# Patient Record
Sex: MALE | Race: BLACK OR AFRICAN AMERICAN | NOT HISPANIC OR LATINO | URBAN - METROPOLITAN AREA
[De-identification: names, ages, dates, MRNs, and addresses within clinical notes are randomized per-mention and may not be internally consistent; named-entity substitution may affect disease eponyms.]

---

## 2017-02-07 ENCOUNTER — EMERGENCY (EMERGENCY)
Facility: HOSPITAL | Age: 57
LOS: 0 days | Discharge: HOME | End: 2017-02-08
Attending: EMERGENCY MEDICINE
Payer: COMMERCIAL

## 2017-02-07 VITALS
DIASTOLIC BLOOD PRESSURE: 98 MMHG | WEIGHT: 205.03 LBS | RESPIRATION RATE: 17 BRPM | TEMPERATURE: 98 F | SYSTOLIC BLOOD PRESSURE: 140 MMHG | HEIGHT: 72 IN | OXYGEN SATURATION: 98 % | HEART RATE: 86 BPM

## 2017-02-07 LAB
ALBUMIN SERPL ELPH-MCNC: 4.3 G/DL — SIGNIFICANT CHANGE UP (ref 3.3–5)
ALP SERPL-CCNC: 148 U/L — HIGH (ref 40–120)
ALT FLD-CCNC: 54 U/L — SIGNIFICANT CHANGE UP (ref 12–78)
ANION GAP SERPL CALC-SCNC: 11 MMOL/L — SIGNIFICANT CHANGE UP (ref 5–17)
AST SERPL-CCNC: 24 U/L — SIGNIFICANT CHANGE UP (ref 15–37)
BASOPHILS # BLD AUTO: 0.1 K/UL — SIGNIFICANT CHANGE UP (ref 0–0.2)
BASOPHILS NFR BLD AUTO: 1.3 % — SIGNIFICANT CHANGE UP (ref 0–2)
BILIRUB SERPL-MCNC: 0.9 MG/DL — SIGNIFICANT CHANGE UP (ref 0.2–1.2)
BUN SERPL-MCNC: 21 MG/DL — SIGNIFICANT CHANGE UP (ref 7–23)
CALCIUM SERPL-MCNC: 9.3 MG/DL — SIGNIFICANT CHANGE UP (ref 8.5–10.1)
CHLORIDE SERPL-SCNC: 96 MMOL/L — SIGNIFICANT CHANGE UP (ref 96–108)
CO2 SERPL-SCNC: 25 MMOL/L — SIGNIFICANT CHANGE UP (ref 22–31)
CREAT SERPL-MCNC: 1.63 MG/DL — HIGH (ref 0.5–1.3)
EOSINOPHIL # BLD AUTO: 0.1 K/UL — SIGNIFICANT CHANGE UP (ref 0–0.5)
EOSINOPHIL NFR BLD AUTO: 1.3 % — SIGNIFICANT CHANGE UP (ref 0–6)
GLUCOSE SERPL-MCNC: 562 MG/DL — CRITICAL HIGH (ref 70–99)
HCT VFR BLD CALC: 42.9 % — SIGNIFICANT CHANGE UP (ref 39–50)
HGB BLD-MCNC: 15.6 G/DL — SIGNIFICANT CHANGE UP (ref 13–17)
LYMPHOCYTES # BLD AUTO: 2.3 K/UL — SIGNIFICANT CHANGE UP (ref 1–3.3)
LYMPHOCYTES # BLD AUTO: 21.4 % — SIGNIFICANT CHANGE UP (ref 13–44)
MCHC RBC-ENTMCNC: 29.7 PG — SIGNIFICANT CHANGE UP (ref 27–34)
MCHC RBC-ENTMCNC: 36.4 GM/DL — HIGH (ref 32–36)
MCV RBC AUTO: 81.7 FL — SIGNIFICANT CHANGE UP (ref 80–100)
MONOCYTES # BLD AUTO: 0.6 K/UL — SIGNIFICANT CHANGE UP (ref 0–0.9)
MONOCYTES NFR BLD AUTO: 5.6 % — SIGNIFICANT CHANGE UP (ref 2–14)
NEUTROPHILS # BLD AUTO: 7.5 K/UL — HIGH (ref 1.8–7.4)
NEUTROPHILS NFR BLD AUTO: 70.4 % — SIGNIFICANT CHANGE UP (ref 43–77)
PLATELET # BLD AUTO: 253 K/UL — SIGNIFICANT CHANGE UP (ref 150–400)
POTASSIUM SERPL-MCNC: 4.5 MMOL/L — SIGNIFICANT CHANGE UP (ref 3.5–5.3)
POTASSIUM SERPL-SCNC: 4.5 MMOL/L — SIGNIFICANT CHANGE UP (ref 3.5–5.3)
PROT SERPL-MCNC: 8.4 GM/DL — HIGH (ref 6–8.3)
RBC # BLD: 5.25 M/UL — SIGNIFICANT CHANGE UP (ref 4.2–5.8)
RBC # FLD: 12 % — SIGNIFICANT CHANGE UP (ref 11–15)
SODIUM SERPL-SCNC: 132 MMOL/L — LOW (ref 135–145)
WBC # BLD: 10.6 K/UL — HIGH (ref 3.8–10.5)
WBC # FLD AUTO: 10.6 K/UL — HIGH (ref 3.8–10.5)

## 2017-02-07 PROCEDURE — 99284 EMERGENCY DEPT VISIT MOD MDM: CPT

## 2017-02-07 RX ORDER — SODIUM CHLORIDE 9 MG/ML
2000 INJECTION INTRAMUSCULAR; INTRAVENOUS; SUBCUTANEOUS ONCE
Qty: 0 | Refills: 0 | Status: COMPLETED | OUTPATIENT
Start: 2017-02-07 | End: 2017-02-07

## 2017-02-07 RX ORDER — METFORMIN HYDROCHLORIDE 850 MG/1
500 TABLET ORAL ONCE
Qty: 0 | Refills: 0 | Status: COMPLETED | OUTPATIENT
Start: 2017-02-07 | End: 2017-02-07

## 2017-02-07 RX ADMIN — METFORMIN HYDROCHLORIDE 500 MILLIGRAM(S): 850 TABLET ORAL at 22:30

## 2017-02-07 RX ADMIN — SODIUM CHLORIDE 2000 MILLILITER(S): 9 INJECTION INTRAMUSCULAR; INTRAVENOUS; SUBCUTANEOUS at 22:31

## 2017-02-07 NOTE — ED PROVIDER NOTE - OBJECTIVE STATEMENT
Pertinent PMH/PSH/FHx/SHx and Review of Systems contained within:    57yo M w PMH of HTN, CAD s/p stent presents to ED for eval of hyperglycemia.  Pt states he went to see PMD bc he noted incr thirst, polyuria & intermittent blurry vision.  Pt had labs done, noted hyperglycemia & instructed to come to ED.  Denies CP, SOB, abd pain, N/V/D.  Pt was not given any meds for sx PTA. Pertinent PMH/PSH/FHx/SHx and Review of Systems contained within:    55yo M w PMH of HTN, CAD s/p stent presents to ED for eval of hyperglycemia.  Pt states he went to see PMD bc he noted incr thirst, polyuria & intermittent blurry vision.  Pt had labs done, noted hyperglycemia & instructed to come to ED.  Denies CP, SOB, abd pain, N/V/D.  Pt was not given any meds for sx PTA.    No fever/chills, No photophobia/eye pain, No ear pain/sore throat/dysphagia, No chest pain/palpitations, no SOB/cough/wheeze/stridor, No abdominal pain, No N/V/D, no dysuria/frequency/discharge, No neck/back pain, no rash, no changes in neurological status/function.

## 2017-02-07 NOTE — ED ADULT TRIAGE NOTE - CHIEF COMPLAINT QUOTE
I received a call from my doctor telling me to come to the er because my blood test said my sugar was 700.  he told me to come for an emergency shot of insulin.  I have never been a diabetic ever before.  I went to the doc because I have been feeling fatigued, increased urinating, and I am always thirsty.  I also lost 25 lbs since december.

## 2017-02-07 NOTE — ED PROVIDER NOTE - MEDICAL DECISION MAKING DETAILS
Pt w above dx, no anion gap VSS, in NAD.  Given first dose of metformin in ED.  Discussed results and outcome of testing with the patient, given copy as well.  Patient advised to please follow up with their primary care doctor within the next 24 hours and return to the Emergency Department for worsening symptoms or any other concerns.  Patient advised that their doctor may call  to follow up on the specific results of the tests performed today in the emergency department.  Given Rx and instructed/cautioned on use.

## 2017-02-07 NOTE — ED ADULT NURSE NOTE - OBJECTIVE STATEMENT
pt c/o of being fatigued lost about 25lbs since November and noticed his vision was blurrey on Friday. Went ot the MD office today did blood work and was told to go to the ER because his blood sugar was 700.

## 2017-02-07 NOTE — ED PROVIDER NOTE - PHYSICAL EXAMINATION
Gen: Alert, NAD, pleasant M, speaking in complete sentences;  Head: NC, AT, PERRL, EOMI, normal lids/conjunctiva;  ENT: normal hearing, patent oropharynx, MMM;  Neck: supple, no tenderness/meningismus/JVD, Trachea midline, FROM;  Pulm: Bilateral clear BS, normal resp effort, no wheeze/stridor/retractions;  CV: RRR, no M/R/G, +dist pulses;  Abd: soft, NT/ND, +BS, no guarding/rebound tenderness;  Mskel: no edema/erythema/cyanosis;  Skin: no rash;  Neuro: AAOx3, no sensory/motor deficits

## 2017-02-08 VITALS
SYSTOLIC BLOOD PRESSURE: 130 MMHG | OXYGEN SATURATION: 98 % | HEART RATE: 80 BPM | DIASTOLIC BLOOD PRESSURE: 80 MMHG | RESPIRATION RATE: 18 BRPM | TEMPERATURE: 98 F

## 2017-02-08 RX ORDER — METFORMIN HYDROCHLORIDE 850 MG/1
1 TABLET ORAL
Qty: 30 | Refills: 0
Start: 2017-02-08 | End: 2017-02-23

## 2017-02-09 DIAGNOSIS — Z95.5 PRESENCE OF CORONARY ANGIOPLASTY IMPLANT AND GRAFT: ICD-10-CM

## 2017-02-09 DIAGNOSIS — R73.9 HYPERGLYCEMIA, UNSPECIFIED: ICD-10-CM

## 2017-02-09 DIAGNOSIS — I10 ESSENTIAL (PRIMARY) HYPERTENSION: ICD-10-CM

## 2017-02-09 DIAGNOSIS — Z79.1 LONG TERM (CURRENT) USE OF NON-STEROIDAL ANTI-INFLAMMATORIES (NSAID): ICD-10-CM

## 2017-02-09 DIAGNOSIS — I25.10 ATHEROSCLEROTIC HEART DISEASE OF NATIVE CORONARY ARTERY WITHOUT ANGINA PECTORIS: ICD-10-CM

## 2017-02-13 ENCOUNTER — EMERGENCY (EMERGENCY)
Facility: HOSPITAL | Age: 57
LOS: 0 days | Discharge: HOME | End: 2017-02-13
Attending: EMERGENCY MEDICINE
Payer: COMMERCIAL

## 2017-02-13 VITALS
SYSTOLIC BLOOD PRESSURE: 131 MMHG | RESPIRATION RATE: 16 BRPM | DIASTOLIC BLOOD PRESSURE: 60 MMHG | TEMPERATURE: 98 F | OXYGEN SATURATION: 99 % | WEIGHT: 199.96 LBS | HEART RATE: 105 BPM | HEIGHT: 72 IN

## 2017-02-13 DIAGNOSIS — M54.9 DORSALGIA, UNSPECIFIED: ICD-10-CM

## 2017-02-13 DIAGNOSIS — I10 ESSENTIAL (PRIMARY) HYPERTENSION: ICD-10-CM

## 2017-02-13 DIAGNOSIS — E11.9 TYPE 2 DIABETES MELLITUS WITHOUT COMPLICATIONS: ICD-10-CM

## 2017-02-13 PROCEDURE — 99284 EMERGENCY DEPT VISIT MOD MDM: CPT

## 2017-02-13 RX ORDER — KETOROLAC TROMETHAMINE 30 MG/ML
30 SYRINGE (ML) INJECTION ONCE
Qty: 0 | Refills: 0 | Status: DISCONTINUED | OUTPATIENT
Start: 2017-02-13 | End: 2017-02-13

## 2017-02-13 RX ORDER — BACLOFEN 100 %
1 POWDER (GRAM) MISCELLANEOUS
Qty: 30 | Refills: 0
Start: 2017-02-13 | End: 2017-02-28

## 2017-02-13 RX ORDER — METHOCARBAMOL 500 MG/1
500 TABLET, FILM COATED ORAL ONCE
Qty: 0 | Refills: 0 | Status: COMPLETED | OUTPATIENT
Start: 2017-02-13 | End: 2017-02-13

## 2017-02-13 RX ORDER — TRAMADOL HYDROCHLORIDE 50 MG/1
1 TABLET ORAL
Qty: 20 | Refills: 0
Start: 2017-02-13 | End: 2017-02-18

## 2017-02-13 RX ADMIN — Medication 30 MILLIGRAM(S): at 16:22

## 2017-02-13 RX ADMIN — METHOCARBAMOL 500 MILLIGRAM(S): 500 TABLET, FILM COATED ORAL at 16:22

## 2017-02-23 ENCOUNTER — FORM ENCOUNTER (OUTPATIENT)
Age: 57
End: 2017-02-23

## 2017-02-24 ENCOUNTER — APPOINTMENT (OUTPATIENT)
Dept: CT IMAGING | Facility: IMAGING CENTER | Age: 57
End: 2017-02-24

## 2017-02-24 ENCOUNTER — OUTPATIENT (OUTPATIENT)
Dept: OUTPATIENT SERVICES | Facility: HOSPITAL | Age: 57
LOS: 1 days | End: 2017-02-24
Payer: COMMERCIAL

## 2017-02-24 DIAGNOSIS — N20.0 CALCULUS OF KIDNEY: ICD-10-CM

## 2017-02-24 PROCEDURE — 74176 CT ABD & PELVIS W/O CONTRAST: CPT

## 2019-03-27 ENCOUNTER — APPOINTMENT (OUTPATIENT)
Dept: UROLOGY | Facility: CLINIC | Age: 59
End: 2019-03-27

## 2019-04-03 ENCOUNTER — APPOINTMENT (OUTPATIENT)
Dept: UROLOGY | Facility: CLINIC | Age: 59
End: 2019-04-03

## 2019-05-20 ENCOUNTER — OTHER (OUTPATIENT)
Age: 59
End: 2019-05-20

## 2019-05-20 ENCOUNTER — APPOINTMENT (OUTPATIENT)
Dept: UROLOGY | Facility: CLINIC | Age: 59
End: 2019-05-20
Payer: COMMERCIAL

## 2019-05-20 VITALS
RESPIRATION RATE: 17 BRPM | TEMPERATURE: 98.3 F | HEART RATE: 81 BPM | BODY MASS INDEX: 29.42 KG/M2 | SYSTOLIC BLOOD PRESSURE: 141 MMHG | HEIGHT: 73 IN | DIASTOLIC BLOOD PRESSURE: 97 MMHG | WEIGHT: 222 LBS

## 2019-05-20 DIAGNOSIS — N20.0 CALCULUS OF KIDNEY: ICD-10-CM

## 2019-05-20 PROCEDURE — 99203 OFFICE O/P NEW LOW 30 MIN: CPT

## 2019-05-20 NOTE — HISTORY OF PRESENT ILLNESS
[FreeTextEntry1] : 59 year old male presents w/ bilateral staghorn stones. \par CT a/p Feb 2017 showed extensive b/l staghorn calculi.\par It seems like there is more parenchymal loss on the left kidney than on the right. \par He c/o muscle spasms in his back occasionally, but is asymptomatic \par Previously seen several urologists, but did not proceed with definitive treatment for unknown reasons.\par This pt will most likely require 2 sessions PCNL for each kidney.\par Explained the risks/benefits of PCNL.\par Potential complications of bleeding, transfusion, selective angioembolization if indicated, adjacent organ injury, fever, sepsis, infection, incomplete stone clearance, bowel or other unusual injury, chest complications, vascular injuries, procedures needed to address any complications, ureteral injury, anesthetic complications, all discussed.\par \par Blood work ordered. \par UA and urine culture ordered\par Renal scan ordered. \par Will schedule for PCNL.

## 2019-05-20 NOTE — ASSESSMENT
[FreeTextEntry1] : Blood work ordered. \par UA and urine culture ordered\par Renal scan ordered. \par Will schedule for PCNL.

## 2019-05-20 NOTE — ADDENDUM
[FreeTextEntry1] :  I, Megan Diego, acted solely as a scribe for Dr. Robert Padron. The documentation recorded by the scribe accurately reflects the service I personally performed and the decision by me.\par

## 2019-05-23 LAB
CALCIUM SERPL-MCNC: 9.5 MG/DL
CALCIUM SERPL-MCNC: 9.5 MG/DL
CREAT SERPL-MCNC: 1.63 MG/DL
PARATHYROID HORMONE INTACT: 27 PG/ML
PSA SERPL-MCNC: 4.19 NG/ML

## 2019-07-10 LAB
APPEARANCE: CLEAR
BACTERIA UR CULT: NORMAL
BACTERIA: NEGATIVE
BILIRUBIN URINE: NEGATIVE
BLOOD URINE: NEGATIVE
COLOR: NORMAL
GLUCOSE QUALITATIVE U: NEGATIVE
HYALINE CASTS: 0 /LPF
KETONES URINE: NEGATIVE
LEUKOCYTE ESTERASE URINE: ABNORMAL
MICROSCOPIC-UA: NORMAL
NITRITE URINE: NEGATIVE
PH URINE: 6
PROTEIN URINE: NEGATIVE
RED BLOOD CELLS URINE: 1 /HPF
SPECIFIC GRAVITY URINE: 1.02
SQUAMOUS EPITHELIAL CELLS: 4 /HPF
UROBILINOGEN URINE: NORMAL
WHITE BLOOD CELLS URINE: 70 /HPF

## 2019-07-15 ENCOUNTER — OUTPATIENT (OUTPATIENT)
Dept: OUTPATIENT SERVICES | Facility: HOSPITAL | Age: 59
LOS: 1 days | End: 2019-07-15
Payer: COMMERCIAL

## 2019-07-15 VITALS
HEART RATE: 76 BPM | DIASTOLIC BLOOD PRESSURE: 90 MMHG | WEIGHT: 216.93 LBS | TEMPERATURE: 97 F | RESPIRATION RATE: 16 BRPM | OXYGEN SATURATION: 98 % | HEIGHT: 73 IN | SYSTOLIC BLOOD PRESSURE: 146 MMHG

## 2019-07-15 DIAGNOSIS — Z01.818 ENCOUNTER FOR OTHER PREPROCEDURAL EXAMINATION: ICD-10-CM

## 2019-07-15 DIAGNOSIS — E11.9 TYPE 2 DIABETES MELLITUS WITHOUT COMPLICATIONS: ICD-10-CM

## 2019-07-15 DIAGNOSIS — I10 ESSENTIAL (PRIMARY) HYPERTENSION: ICD-10-CM

## 2019-07-15 DIAGNOSIS — N20.0 CALCULUS OF KIDNEY: ICD-10-CM

## 2019-07-15 DIAGNOSIS — Z98.890 OTHER SPECIFIED POSTPROCEDURAL STATES: Chronic | ICD-10-CM

## 2019-07-15 LAB
ANION GAP SERPL CALC-SCNC: 15 MMOL/L — SIGNIFICANT CHANGE UP (ref 5–17)
BLD GP AB SCN SERPL QL: NEGATIVE — SIGNIFICANT CHANGE UP
BUN SERPL-MCNC: 22 MG/DL — SIGNIFICANT CHANGE UP (ref 7–23)
CALCIUM SERPL-MCNC: 9.2 MG/DL — SIGNIFICANT CHANGE UP (ref 8.4–10.5)
CHLORIDE SERPL-SCNC: 105 MMOL/L — SIGNIFICANT CHANGE UP (ref 96–108)
CO2 SERPL-SCNC: 19 MMOL/L — LOW (ref 22–31)
CREAT SERPL-MCNC: 1.44 MG/DL — HIGH (ref 0.5–1.3)
GLUCOSE SERPL-MCNC: 156 MG/DL — HIGH (ref 70–99)
HBA1C BLD-MCNC: 7 % — HIGH (ref 4–5.6)
HCT VFR BLD CALC: 41.4 % — SIGNIFICANT CHANGE UP (ref 39–50)
HGB BLD-MCNC: 13.8 G/DL — SIGNIFICANT CHANGE UP (ref 13–17)
MCHC RBC-ENTMCNC: 29.1 PG — SIGNIFICANT CHANGE UP (ref 27–34)
MCHC RBC-ENTMCNC: 33.3 GM/DL — SIGNIFICANT CHANGE UP (ref 32–36)
MCV RBC AUTO: 87.3 FL — SIGNIFICANT CHANGE UP (ref 80–100)
PLATELET # BLD AUTO: 243 K/UL — SIGNIFICANT CHANGE UP (ref 150–400)
POTASSIUM SERPL-MCNC: 4.3 MMOL/L — SIGNIFICANT CHANGE UP (ref 3.5–5.3)
POTASSIUM SERPL-SCNC: 4.3 MMOL/L — SIGNIFICANT CHANGE UP (ref 3.5–5.3)
RBC # BLD: 4.74 M/UL — SIGNIFICANT CHANGE UP (ref 4.2–5.8)
RBC # FLD: 13.9 % — SIGNIFICANT CHANGE UP (ref 10.3–14.5)
RH IG SCN BLD-IMP: POSITIVE — SIGNIFICANT CHANGE UP
SODIUM SERPL-SCNC: 139 MMOL/L — SIGNIFICANT CHANGE UP (ref 135–145)
WBC # BLD: 9.2 K/UL — SIGNIFICANT CHANGE UP (ref 3.8–10.5)
WBC # FLD AUTO: 9.2 K/UL — SIGNIFICANT CHANGE UP (ref 3.8–10.5)

## 2019-07-15 RX ORDER — LIDOCAINE HCL 20 MG/ML
0.2 VIAL (ML) INJECTION ONCE
Refills: 0 | Status: DISCONTINUED | OUTPATIENT
Start: 2019-07-23 | End: 2019-07-25

## 2019-07-15 RX ORDER — SODIUM CHLORIDE 9 MG/ML
3 INJECTION INTRAMUSCULAR; INTRAVENOUS; SUBCUTANEOUS EVERY 8 HOURS
Refills: 0 | Status: DISCONTINUED | OUTPATIENT
Start: 2019-07-23 | End: 2019-07-25

## 2019-07-15 NOTE — H&P PST ADULT - NSICDXPASTMEDICALHX_GEN_ALL_CORE_FT
PAST MEDICAL HISTORY:  DM (diabetes mellitus)     High blood pressure PAST MEDICAL HISTORY:  Cerebrovascular accident (CVA) 2009 - no residuals    DM (diabetes mellitus) type 2    Eczema left arm    High blood pressure     Smoker for 45 years, down to 1/2 a pack daily

## 2019-07-15 NOTE — H&P PST ADULT - HISTORY OF PRESENT ILLNESS
60 yo male, PMH HTN, HLD, DM2, CVA ~ 10 years ago, known bilateral staghorn stones for over 10 years, PCP encouraged to see urologist since they are getting larger. Pt. presents to PST for scheduled Stage I, Left Percutaneous Nephrostolithotomy 7/23/19 and 2 days later will have Right PCNL. Pt. denies any back pain, flank pain, dysuria, fever, chills, chest pain, SOB, changes in bowel habits. 60 yo male, PMH HTN, HLD, DM2, CVA ~ 10 years ago (no residuals), known bilateral staghorn stones for over 10 years, PCP encouraged to see urologist since they are getting larger. Pt. presents to PST for scheduled Stage I, Left Percutaneous Nephrostolithotomy 7/23/19 and 2 days later will have Right PCNL. Pt. denies any back pain, flank pain, dysuria, fever, chills, chest pain, SOB, changes in bowel habits.

## 2019-07-15 NOTE — H&P PST ADULT - NEGATIVE GENERAL GENITOURINARY SYMPTOMS
no nocturia/no hematuria/normal urinary frequency/no bladder infections/no incontinence no nocturia/no hematuria/normal urinary frequency/no renal colic/no incontinence/no flank pain R/no bladder infections/no dysuria/no flank pain L

## 2019-07-15 NOTE — H&P PST ADULT - NEGATIVE ENMT SYMPTOMS
no sinus symptoms/no nasal congestion no vertigo/no sinus symptoms/no tinnitus/no hearing difficulty/no nasal congestion/no ear pain

## 2019-07-15 NOTE — H&P PST ADULT - NEGATIVE CARDIOVASCULAR SYMPTOMS
no palpitations/no chest pain/no dyspnea on exertion no chest pain/no peripheral edema/no palpitations/no dyspnea on exertion

## 2019-07-15 NOTE — H&P PST ADULT - NSICDXPROBLEM_GEN_ALL_CORE_FT
PROBLEM DIAGNOSES  Problem: Staghorn calculus  Assessment and Plan: Stage I, Left Percutaneous Nephrostolithotomy, followed by Stage II while in the hospital  Pre-op instructions, including Chlorhexidine soap, provided - al questions answered    Problem: Hypertension  Assessment and Plan: Continue BP meds as directed    Problem: DM2 (diabetes mellitus, type 2)  Assessment and Plan: FS on arrival to [facility]

## 2019-07-15 NOTE — H&P PST ADULT - NSICDXPASTSURGICALHX_GEN_ALL_CORE_FT
PAST SURGICAL HISTORY:  No significant past surgical history PAST SURGICAL HISTORY:  S/P repair of pectoralis muscle tear due to lift weighting 2012

## 2019-07-22 ENCOUNTER — TRANSCRIPTION ENCOUNTER (OUTPATIENT)
Age: 59
End: 2019-07-22

## 2019-07-23 ENCOUNTER — INPATIENT (INPATIENT)
Facility: HOSPITAL | Age: 59
LOS: 3 days | Discharge: HOME | DRG: 660 | End: 2019-07-27
Attending: UROLOGY | Admitting: UROLOGY
Payer: COMMERCIAL

## 2019-07-23 ENCOUNTER — RESULT REVIEW (OUTPATIENT)
Age: 59
End: 2019-07-23

## 2019-07-23 ENCOUNTER — APPOINTMENT (OUTPATIENT)
Dept: UROLOGY | Facility: HOSPITAL | Age: 59
End: 2019-07-23

## 2019-07-23 VITALS
OXYGEN SATURATION: 98 % | TEMPERATURE: 98 F | DIASTOLIC BLOOD PRESSURE: 94 MMHG | HEART RATE: 74 BPM | SYSTOLIC BLOOD PRESSURE: 138 MMHG | RESPIRATION RATE: 18 BRPM | WEIGHT: 216.93 LBS | HEIGHT: 73 IN

## 2019-07-23 DIAGNOSIS — N20.0 CALCULUS OF KIDNEY: ICD-10-CM

## 2019-07-23 DIAGNOSIS — Z01.818 ENCOUNTER FOR OTHER PREPROCEDURAL EXAMINATION: ICD-10-CM

## 2019-07-23 DIAGNOSIS — Z98.890 OTHER SPECIFIED POSTPROCEDURAL STATES: Chronic | ICD-10-CM

## 2019-07-23 LAB
ANION GAP SERPL CALC-SCNC: 13 MMOL/L — SIGNIFICANT CHANGE UP (ref 5–17)
BASOPHILS # BLD AUTO: 0 K/UL — SIGNIFICANT CHANGE UP (ref 0–0.2)
BUN SERPL-MCNC: 18 MG/DL — SIGNIFICANT CHANGE UP (ref 7–23)
CALCIUM SERPL-MCNC: 8.7 MG/DL — SIGNIFICANT CHANGE UP (ref 8.4–10.5)
CHLORIDE SERPL-SCNC: 104 MMOL/L — SIGNIFICANT CHANGE UP (ref 96–108)
CO2 SERPL-SCNC: 23 MMOL/L — SIGNIFICANT CHANGE UP (ref 22–31)
CREAT SERPL-MCNC: 1.54 MG/DL — HIGH (ref 0.5–1.3)
EOSINOPHIL # BLD AUTO: 0.2 K/UL — SIGNIFICANT CHANGE UP (ref 0–0.5)
EOSINOPHIL NFR BLD AUTO: 1 % — SIGNIFICANT CHANGE UP (ref 0–6)
GLUCOSE BLDC GLUCOMTR-MCNC: 140 MG/DL — HIGH (ref 70–99)
GLUCOSE BLDC GLUCOMTR-MCNC: 186 MG/DL — HIGH (ref 70–99)
GLUCOSE SERPL-MCNC: 183 MG/DL — HIGH (ref 70–99)
HCT VFR BLD CALC: 41.5 % — SIGNIFICANT CHANGE UP (ref 39–50)
HGB BLD-MCNC: 14.8 G/DL — SIGNIFICANT CHANGE UP (ref 13–17)
LYMPHOCYTES # BLD AUTO: 11 % — LOW (ref 13–44)
LYMPHOCYTES # BLD AUTO: 2.4 K/UL — SIGNIFICANT CHANGE UP (ref 1–3.3)
MCHC RBC-ENTMCNC: 31.1 PG — SIGNIFICANT CHANGE UP (ref 27–34)
MCHC RBC-ENTMCNC: 35.7 GM/DL — SIGNIFICANT CHANGE UP (ref 32–36)
MCV RBC AUTO: 87.1 FL — SIGNIFICANT CHANGE UP (ref 80–100)
MONOCYTES # BLD AUTO: 0.4 K/UL — SIGNIFICANT CHANGE UP (ref 0–0.9)
MONOCYTES NFR BLD AUTO: 1 % — LOW (ref 2–14)
NEUTROPHILS # BLD AUTO: 21.7 K/UL — HIGH (ref 1.8–7.4)
NEUTROPHILS NFR BLD AUTO: 84 % — HIGH (ref 43–77)
PLATELET # BLD AUTO: 134 K/UL — LOW (ref 150–400)
POTASSIUM SERPL-MCNC: 5.6 MMOL/L — HIGH (ref 3.5–5.3)
POTASSIUM SERPL-SCNC: 5.6 MMOL/L — HIGH (ref 3.5–5.3)
RBC # BLD: 4.76 M/UL — SIGNIFICANT CHANGE UP (ref 4.2–5.8)
RBC # FLD: 13.5 % — SIGNIFICANT CHANGE UP (ref 10.3–14.5)
RH IG SCN BLD-IMP: POSITIVE — SIGNIFICANT CHANGE UP
SODIUM SERPL-SCNC: 140 MMOL/L — SIGNIFICANT CHANGE UP (ref 135–145)
WBC # BLD: 24.7 K/UL — HIGH (ref 3.8–10.5)
WBC # FLD AUTO: 24.7 K/UL — HIGH (ref 3.8–10.5)

## 2019-07-23 PROCEDURE — 76000 FLUOROSCOPY <1 HR PHYS/QHP: CPT

## 2019-07-23 PROCEDURE — 80048 BASIC METABOLIC PNL TOTAL CA: CPT

## 2019-07-23 PROCEDURE — 88300 SURGICAL PATH GROSS: CPT | Mod: 26

## 2019-07-23 PROCEDURE — 83036 HEMOGLOBIN GLYCOSYLATED A1C: CPT

## 2019-07-23 PROCEDURE — 85027 COMPLETE CBC AUTOMATED: CPT

## 2019-07-23 PROCEDURE — 86900 BLOOD TYPING SEROLOGIC ABO: CPT

## 2019-07-23 PROCEDURE — G0463: CPT

## 2019-07-23 PROCEDURE — 86850 RBC ANTIBODY SCREEN: CPT

## 2019-07-23 PROCEDURE — 86901 BLOOD TYPING SEROLOGIC RH(D): CPT

## 2019-07-23 PROCEDURE — 71045 X-RAY EXAM CHEST 1 VIEW: CPT | Mod: 26

## 2019-07-23 RX ORDER — DEXTROSE 50 % IN WATER 50 %
25 SYRINGE (ML) INTRAVENOUS ONCE
Refills: 0 | Status: DISCONTINUED | OUTPATIENT
Start: 2019-07-23 | End: 2019-07-25

## 2019-07-23 RX ORDER — CEFAZOLIN SODIUM 1 G
VIAL (EA) INJECTION
Refills: 0 | Status: DISCONTINUED | OUTPATIENT
Start: 2019-07-23 | End: 2019-07-23

## 2019-07-23 RX ORDER — DEXTROSE 50 % IN WATER 50 %
15 SYRINGE (ML) INTRAVENOUS ONCE
Refills: 0 | Status: DISCONTINUED | OUTPATIENT
Start: 2019-07-23 | End: 2019-07-25

## 2019-07-23 RX ORDER — SENNA PLUS 8.6 MG/1
2 TABLET ORAL AT BEDTIME
Refills: 0 | Status: DISCONTINUED | OUTPATIENT
Start: 2019-07-23 | End: 2019-07-25

## 2019-07-23 RX ORDER — DEXTROSE 50 % IN WATER 50 %
12.5 SYRINGE (ML) INTRAVENOUS ONCE
Refills: 0 | Status: DISCONTINUED | OUTPATIENT
Start: 2019-07-23 | End: 2019-07-25

## 2019-07-23 RX ORDER — CEFAZOLIN SODIUM 1 G
1000 VIAL (EA) INJECTION ONCE
Refills: 0 | Status: DISCONTINUED | OUTPATIENT
Start: 2019-07-23 | End: 2019-07-24

## 2019-07-23 RX ORDER — HEPARIN SODIUM 5000 [USP'U]/ML
5000 INJECTION INTRAVENOUS; SUBCUTANEOUS EVERY 8 HOURS
Refills: 0 | Status: DISCONTINUED | OUTPATIENT
Start: 2019-07-23 | End: 2019-07-25

## 2019-07-23 RX ORDER — INSULIN LISPRO 100/ML
VIAL (ML) SUBCUTANEOUS
Refills: 0 | Status: DISCONTINUED | OUTPATIENT
Start: 2019-07-23 | End: 2019-07-25

## 2019-07-23 RX ORDER — CEFAZOLIN SODIUM 1 G
2000 VIAL (EA) INJECTION ONCE
Refills: 0 | Status: DISCONTINUED | OUTPATIENT
Start: 2019-07-23 | End: 2019-07-25

## 2019-07-23 RX ORDER — ONDANSETRON 8 MG/1
4 TABLET, FILM COATED ORAL ONCE
Refills: 0 | Status: DISCONTINUED | OUTPATIENT
Start: 2019-07-23 | End: 2019-07-24

## 2019-07-23 RX ORDER — ATORVASTATIN CALCIUM 80 MG/1
10 TABLET, FILM COATED ORAL AT BEDTIME
Refills: 0 | Status: DISCONTINUED | OUTPATIENT
Start: 2019-07-23 | End: 2019-07-25

## 2019-07-23 RX ORDER — SODIUM CHLORIDE 9 MG/ML
1000 INJECTION, SOLUTION INTRAVENOUS
Refills: 0 | Status: DISCONTINUED | OUTPATIENT
Start: 2019-07-23 | End: 2019-07-25

## 2019-07-23 RX ORDER — METOPROLOL TARTRATE 50 MG
25 TABLET ORAL DAILY
Refills: 0 | Status: DISCONTINUED | OUTPATIENT
Start: 2019-07-23 | End: 2019-07-25

## 2019-07-23 RX ORDER — SODIUM CHLORIDE 9 MG/ML
1000 INJECTION, SOLUTION INTRAVENOUS
Refills: 0 | Status: DISCONTINUED | OUTPATIENT
Start: 2019-07-23 | End: 2019-07-23

## 2019-07-23 RX ORDER — HYDROMORPHONE HYDROCHLORIDE 2 MG/ML
0.5 INJECTION INTRAMUSCULAR; INTRAVENOUS; SUBCUTANEOUS
Refills: 0 | Status: DISCONTINUED | OUTPATIENT
Start: 2019-07-23 | End: 2019-07-24

## 2019-07-23 RX ORDER — GENTAMICIN SULFATE 40 MG/ML
100 VIAL (ML) INJECTION EVERY 8 HOURS
Refills: 0 | Status: DISCONTINUED | OUTPATIENT
Start: 2019-07-23 | End: 2019-07-24

## 2019-07-23 RX ORDER — GLUCAGON INJECTION, SOLUTION 0.5 MG/.1ML
1 INJECTION, SOLUTION SUBCUTANEOUS ONCE
Refills: 0 | Status: DISCONTINUED | OUTPATIENT
Start: 2019-07-23 | End: 2019-07-25

## 2019-07-23 RX ORDER — CEFAZOLIN SODIUM 1 G
1000 VIAL (EA) INJECTION EVERY 8 HOURS
Refills: 0 | Status: DISCONTINUED | OUTPATIENT
Start: 2019-07-24 | End: 2019-07-24

## 2019-07-23 RX ORDER — OXYCODONE HYDROCHLORIDE 5 MG/1
5 TABLET ORAL EVERY 4 HOURS
Refills: 0 | Status: DISCONTINUED | OUTPATIENT
Start: 2019-07-23 | End: 2019-07-25

## 2019-07-23 RX ORDER — ACETAMINOPHEN 500 MG
650 TABLET ORAL EVERY 6 HOURS
Refills: 0 | Status: DISCONTINUED | OUTPATIENT
Start: 2019-07-23 | End: 2019-07-25

## 2019-07-23 RX ORDER — MORPHINE SULFATE 50 MG/1
4 CAPSULE, EXTENDED RELEASE ORAL
Refills: 0 | Status: DISCONTINUED | OUTPATIENT
Start: 2019-07-23 | End: 2019-07-25

## 2019-07-23 RX ORDER — CEFAZOLIN SODIUM 1 G
VIAL (EA) INJECTION
Refills: 0 | Status: DISCONTINUED | OUTPATIENT
Start: 2019-07-23 | End: 2019-07-24

## 2019-07-23 RX ORDER — INSULIN LISPRO 100/ML
VIAL (ML) SUBCUTANEOUS AT BEDTIME
Refills: 0 | Status: DISCONTINUED | OUTPATIENT
Start: 2019-07-23 | End: 2019-07-25

## 2019-07-23 RX ORDER — SODIUM CHLORIDE 9 MG/ML
1000 INJECTION INTRAMUSCULAR; INTRAVENOUS; SUBCUTANEOUS
Refills: 0 | Status: DISCONTINUED | OUTPATIENT
Start: 2019-07-23 | End: 2019-07-24

## 2019-07-23 RX ORDER — LOSARTAN POTASSIUM 100 MG/1
50 TABLET, FILM COATED ORAL DAILY
Refills: 0 | Status: DISCONTINUED | OUTPATIENT
Start: 2019-07-23 | End: 2019-07-25

## 2019-07-23 RX ADMIN — HYDROMORPHONE HYDROCHLORIDE 0.5 MILLIGRAM(S): 2 INJECTION INTRAMUSCULAR; INTRAVENOUS; SUBCUTANEOUS at 22:45

## 2019-07-23 RX ADMIN — HYDROMORPHONE HYDROCHLORIDE 0.5 MILLIGRAM(S): 2 INJECTION INTRAMUSCULAR; INTRAVENOUS; SUBCUTANEOUS at 23:00

## 2019-07-23 RX ADMIN — SODIUM CHLORIDE 125 MILLILITER(S): 9 INJECTION INTRAMUSCULAR; INTRAVENOUS; SUBCUTANEOUS at 23:41

## 2019-07-23 RX ADMIN — HYDROMORPHONE HYDROCHLORIDE 0.5 MILLIGRAM(S): 2 INJECTION INTRAMUSCULAR; INTRAVENOUS; SUBCUTANEOUS at 23:15

## 2019-07-23 RX ADMIN — HYDROMORPHONE HYDROCHLORIDE 0.5 MILLIGRAM(S): 2 INJECTION INTRAMUSCULAR; INTRAVENOUS; SUBCUTANEOUS at 22:34

## 2019-07-23 RX ADMIN — HYDROMORPHONE HYDROCHLORIDE 0.5 MILLIGRAM(S): 2 INJECTION INTRAMUSCULAR; INTRAVENOUS; SUBCUTANEOUS at 23:53

## 2019-07-23 RX ADMIN — HEPARIN SODIUM 5000 UNIT(S): 5000 INJECTION INTRAVENOUS; SUBCUTANEOUS at 22:26

## 2019-07-23 RX ADMIN — HYDROMORPHONE HYDROCHLORIDE 0.5 MILLIGRAM(S): 2 INJECTION INTRAMUSCULAR; INTRAVENOUS; SUBCUTANEOUS at 23:40

## 2019-07-23 RX ADMIN — ATORVASTATIN CALCIUM 10 MILLIGRAM(S): 80 TABLET, FILM COATED ORAL at 22:26

## 2019-07-23 RX ADMIN — SODIUM CHLORIDE 125 MILLILITER(S): 9 INJECTION, SOLUTION INTRAVENOUS at 22:25

## 2019-07-23 RX ADMIN — SODIUM CHLORIDE 3 MILLILITER(S): 9 INJECTION INTRAMUSCULAR; INTRAVENOUS; SUBCUTANEOUS at 22:24

## 2019-07-23 NOTE — CHART NOTE - NSCHARTNOTEFT_GEN_A_CORE
The patient underwent PCNL which required nephrostomy tube insertion to control bleeding. Patient will require inpatient admission for monitoring. (Z91.89).

## 2019-07-24 ENCOUNTER — TRANSCRIPTION ENCOUNTER (OUTPATIENT)
Age: 59
End: 2019-07-24

## 2019-07-24 LAB
ANION GAP SERPL CALC-SCNC: 12 MMOL/L — SIGNIFICANT CHANGE UP (ref 5–17)
ANION GAP SERPL CALC-SCNC: 15 MMOL/L — SIGNIFICANT CHANGE UP (ref 5–17)
BUN SERPL-MCNC: 17 MG/DL — SIGNIFICANT CHANGE UP (ref 7–23)
BUN SERPL-MCNC: 18 MG/DL — SIGNIFICANT CHANGE UP (ref 7–23)
CALCIUM SERPL-MCNC: 8.7 MG/DL — SIGNIFICANT CHANGE UP (ref 8.4–10.5)
CALCIUM SERPL-MCNC: 8.7 MG/DL — SIGNIFICANT CHANGE UP (ref 8.4–10.5)
CHLORIDE SERPL-SCNC: 102 MMOL/L — SIGNIFICANT CHANGE UP (ref 96–108)
CHLORIDE SERPL-SCNC: 103 MMOL/L — SIGNIFICANT CHANGE UP (ref 96–108)
CO2 SERPL-SCNC: 19 MMOL/L — LOW (ref 22–31)
CO2 SERPL-SCNC: 25 MMOL/L — SIGNIFICANT CHANGE UP (ref 22–31)
CREAT SERPL-MCNC: 1.45 MG/DL — HIGH (ref 0.5–1.3)
CREAT SERPL-MCNC: 1.57 MG/DL — HIGH (ref 0.5–1.3)
GLUCOSE BLDC GLUCOMTR-MCNC: 143 MG/DL — HIGH (ref 70–99)
GLUCOSE BLDC GLUCOMTR-MCNC: 153 MG/DL — HIGH (ref 70–99)
GLUCOSE BLDC GLUCOMTR-MCNC: 161 MG/DL — HIGH (ref 70–99)
GLUCOSE BLDC GLUCOMTR-MCNC: 164 MG/DL — HIGH (ref 70–99)
GLUCOSE SERPL-MCNC: 164 MG/DL — HIGH (ref 70–99)
GLUCOSE SERPL-MCNC: 225 MG/DL — HIGH (ref 70–99)
HCT VFR BLD CALC: 39 % — SIGNIFICANT CHANGE UP (ref 39–50)
HGB BLD-MCNC: 13.7 G/DL — SIGNIFICANT CHANGE UP (ref 13–17)
MCHC RBC-ENTMCNC: 30.7 PG — SIGNIFICANT CHANGE UP (ref 27–34)
MCHC RBC-ENTMCNC: 35.2 GM/DL — SIGNIFICANT CHANGE UP (ref 32–36)
MCV RBC AUTO: 87.2 FL — SIGNIFICANT CHANGE UP (ref 80–100)
PLATELET # BLD AUTO: 256 K/UL — SIGNIFICANT CHANGE UP (ref 150–400)
POTASSIUM SERPL-MCNC: 5 MMOL/L — SIGNIFICANT CHANGE UP (ref 3.5–5.3)
POTASSIUM SERPL-MCNC: 5.4 MMOL/L — HIGH (ref 3.5–5.3)
POTASSIUM SERPL-SCNC: 5 MMOL/L — SIGNIFICANT CHANGE UP (ref 3.5–5.3)
POTASSIUM SERPL-SCNC: 5.4 MMOL/L — HIGH (ref 3.5–5.3)
RBC # BLD: 4.47 M/UL — SIGNIFICANT CHANGE UP (ref 4.2–5.8)
RBC # FLD: 13.5 % — SIGNIFICANT CHANGE UP (ref 10.3–14.5)
SODIUM SERPL-SCNC: 136 MMOL/L — SIGNIFICANT CHANGE UP (ref 135–145)
SODIUM SERPL-SCNC: 140 MMOL/L — SIGNIFICANT CHANGE UP (ref 135–145)
WBC # BLD: 20 K/UL — HIGH (ref 3.8–10.5)
WBC # FLD AUTO: 20 K/UL — HIGH (ref 3.8–10.5)

## 2019-07-24 PROCEDURE — 74176 CT ABD & PELVIS W/O CONTRAST: CPT | Mod: 26

## 2019-07-24 PROCEDURE — 93010 ELECTROCARDIOGRAM REPORT: CPT

## 2019-07-24 RX ORDER — SODIUM POLYSTYRENE SULFONATE 4.1 MEQ/G
15 POWDER, FOR SUSPENSION ORAL ONCE
Refills: 0 | Status: DISCONTINUED | OUTPATIENT
Start: 2019-07-24 | End: 2019-07-24

## 2019-07-24 RX ORDER — ACETAMINOPHEN 500 MG
1000 TABLET ORAL ONCE
Refills: 0 | Status: COMPLETED | OUTPATIENT
Start: 2019-07-24 | End: 2019-07-24

## 2019-07-24 RX ORDER — GENTAMICIN SULFATE 40 MG/ML
100 VIAL (ML) INJECTION EVERY 8 HOURS
Refills: 0 | Status: DISCONTINUED | OUTPATIENT
Start: 2019-07-24 | End: 2019-07-25

## 2019-07-24 RX ORDER — SODIUM CHLORIDE 9 MG/ML
1000 INJECTION, SOLUTION INTRAVENOUS
Refills: 0 | Status: DISCONTINUED | OUTPATIENT
Start: 2019-07-24 | End: 2019-07-25

## 2019-07-24 RX ORDER — CEFAZOLIN SODIUM 1 G
1000 VIAL (EA) INJECTION EVERY 8 HOURS
Refills: 0 | Status: DISCONTINUED | OUTPATIENT
Start: 2019-07-24 | End: 2019-07-25

## 2019-07-24 RX ADMIN — Medication 200 MILLIGRAM(S): at 21:07

## 2019-07-24 RX ADMIN — SODIUM CHLORIDE 75 MILLILITER(S): 9 INJECTION, SOLUTION INTRAVENOUS at 21:15

## 2019-07-24 RX ADMIN — ATORVASTATIN CALCIUM 10 MILLIGRAM(S): 80 TABLET, FILM COATED ORAL at 21:07

## 2019-07-24 RX ADMIN — MORPHINE SULFATE 4 MILLIGRAM(S): 50 CAPSULE, EXTENDED RELEASE ORAL at 21:25

## 2019-07-24 RX ADMIN — HEPARIN SODIUM 5000 UNIT(S): 5000 INJECTION INTRAVENOUS; SUBCUTANEOUS at 12:52

## 2019-07-24 RX ADMIN — SODIUM CHLORIDE 3 MILLILITER(S): 9 INJECTION INTRAMUSCULAR; INTRAVENOUS; SUBCUTANEOUS at 16:08

## 2019-07-24 RX ADMIN — OXYCODONE HYDROCHLORIDE 5 MILLIGRAM(S): 5 TABLET ORAL at 16:55

## 2019-07-24 RX ADMIN — Medication 200 MILLIGRAM(S): at 12:10

## 2019-07-24 RX ADMIN — Medication 100 MILLIGRAM(S): at 12:51

## 2019-07-24 RX ADMIN — SODIUM CHLORIDE 75 MILLILITER(S): 9 INJECTION, SOLUTION INTRAVENOUS at 06:52

## 2019-07-24 RX ADMIN — Medication 100 MILLIGRAM(S): at 22:17

## 2019-07-24 RX ADMIN — OXYCODONE HYDROCHLORIDE 5 MILLIGRAM(S): 5 TABLET ORAL at 05:45

## 2019-07-24 RX ADMIN — Medication 200 MILLIGRAM(S): at 04:21

## 2019-07-24 RX ADMIN — Medication 25 MILLIGRAM(S): at 05:43

## 2019-07-24 RX ADMIN — Medication 1: at 08:56

## 2019-07-24 RX ADMIN — LOSARTAN POTASSIUM 50 MILLIGRAM(S): 100 TABLET, FILM COATED ORAL at 05:43

## 2019-07-24 RX ADMIN — Medication 1: at 13:32

## 2019-07-24 RX ADMIN — OXYCODONE HYDROCHLORIDE 5 MILLIGRAM(S): 5 TABLET ORAL at 01:16

## 2019-07-24 RX ADMIN — OXYCODONE HYDROCHLORIDE 5 MILLIGRAM(S): 5 TABLET ORAL at 17:25

## 2019-07-24 RX ADMIN — OXYCODONE HYDROCHLORIDE 5 MILLIGRAM(S): 5 TABLET ORAL at 05:14

## 2019-07-24 RX ADMIN — HEPARIN SODIUM 5000 UNIT(S): 5000 INJECTION INTRAVENOUS; SUBCUTANEOUS at 05:43

## 2019-07-24 RX ADMIN — OXYCODONE HYDROCHLORIDE 5 MILLIGRAM(S): 5 TABLET ORAL at 00:07

## 2019-07-24 RX ADMIN — MORPHINE SULFATE 4 MILLIGRAM(S): 50 CAPSULE, EXTENDED RELEASE ORAL at 21:07

## 2019-07-24 RX ADMIN — Medication 100 MILLIGRAM(S): at 03:14

## 2019-07-24 RX ADMIN — Medication 1000 MILLIGRAM(S): at 04:30

## 2019-07-24 RX ADMIN — Medication 400 MILLIGRAM(S): at 04:10

## 2019-07-24 RX ADMIN — HEPARIN SODIUM 5000 UNIT(S): 5000 INJECTION INTRAVENOUS; SUBCUTANEOUS at 21:07

## 2019-07-24 NOTE — PROGRESS NOTE ADULT - SUBJECTIVE AND OBJECTIVE BOX
Post op Check    Pt seen and examined without complaints. Pain is controlled. Denies SOB/CP/N/V.   No numbness,    Vital Signs Last 24 Hrs  T(C): 36.4 (23 Jul 2019 23:40), Max: 36.9 (23 Jul 2019 15:20)  T(F): 97.5 (23 Jul 2019 23:40), Max: 98.4 (23 Jul 2019 15:20)  HR: 84 (23 Jul 2019 23:40) (68 - 84)  BP: 135/77 (23 Jul 2019 23:40) (122/86 - 154/100)  BP(mean): 96 (23 Jul 2019 23:15) (96 - 122)  RR: 16 (23 Jul 2019 23:40) (15 - 18)  SpO2: 97% (23 Jul 2019 23:40) (95% - 99%)    I&O's Summary    23 Jul 2019 07:01  -  24 Jul 2019 00:21  --------------------------------------------------------  IN: 250 mL / OUT: 400 mL / NET: -150 mL        Physical Exam  Gen: NAD  Abd: Soft, NT, ND  Back: L PCN in place, clamped. Clamp opened up on exam  : ham in place, urine red                          14.8   24.7  )-----------( 134      ( 23 Jul 2019 22:03 )             41.5       07-23    140  |  104  |  18  ----------------------------<  183<H>  5.6<H>   |  23  |  1.54<H>    Ca    8.7      23 Jul 2019 22:03        A/P: 59y Male s/p L PCNL    -DVT prophylaxis/OOB  -Incentive spirometry  -Strict I&O's  -Analgesia and antiemetics as needed  -Diet  -AM labs

## 2019-07-24 NOTE — PROGRESS NOTE ADULT - ASSESSMENT
60 yo m s/p left pcnl     ct today  tov underway   likely stage 2 in am with preop   reg diet   oob/ is/ dvt   getn x 24 hrs

## 2019-07-24 NOTE — PROGRESS NOTE ADULT - SUBJECTIVE AND OBJECTIVE BOX
Subjective  feeling well; pain controlled with iv medication   Objective    Vital signs  T(F): , Max: 98.5 (07-24-19 @ 06:10)  HR: 92 (07-24-19 @ 06:10)  BP: 130/90 (07-24-19 @ 06:10)  SpO2: 96% (07-24-19 @ 06:10)  Wt(kg): --    Output     07-23 @ 07:01  -  07-24 @ 07:00  --------------------------------------------------------  IN: 1775 mL / OUT: 1450 mL / NET: 325 mL        Gen awake alert nad axox3  Abd obese soft ntnd   Back tube in place no hematoma/ ecchymosis  tube dilute cherry    ham pink     Labs      07-24 @ 03:24    WBC 20.0  / Hct 39.0  / SCr 1.45     07-23 @ 22:03    WBC 24.7  / Hct 41.5  / SCr 1.54

## 2019-07-24 NOTE — PROGRESS NOTE ADULT - SUBJECTIVE AND OBJECTIVE BOX
Urology Preop Note    Diagnosis: nephrolithiasis   Procedure: stage 2 pcnl   Surgeon: dr. antonella javier   time: 815  date: 7/25/19                           13.7   20.0  )-----------( 256      ( 24 Jul 2019 03:24 )             39.0       07-24    140  |  103  |  18  ----------------------------<  164<H>  5.0   |  25  |  1.57<H>    Ca    8.7      24 Jul 2019 10:55              UCx: neg     EKG:   CXR:

## 2019-07-24 NOTE — PRE-ANESTHESIA EVALUATION ADULT - NSANTHOSAYNRD_GEN_A_CORE
No. NATALIE screening performed.  STOP BANG Legend: 0-2 = LOW Risk; 3-4 = INTERMEDIATE Risk; 5-8 = HIGH Risk
No. NATALIE screening performed.  STOP BANG Legend: 0-2 = LOW Risk; 3-4 = INTERMEDIATE Risk; 5-8 = HIGH Risk

## 2019-07-24 NOTE — PRE-ANESTHESIA EVALUATION ADULT - NSANTHPMHFT_GEN_ALL_CORE
60 y/o male, PMH HTN, HLD, DM2, CVA ~ 10 years ago (no residuals), known bilateral staghorn stones for over 10 years, s/p left percutaneous nephrostolithotomy for left renal stone

## 2019-07-25 ENCOUNTER — APPOINTMENT (OUTPATIENT)
Dept: UROLOGY | Facility: HOSPITAL | Age: 59
End: 2019-07-25

## 2019-07-25 ENCOUNTER — RESULT REVIEW (OUTPATIENT)
Age: 59
End: 2019-07-25

## 2019-07-25 LAB
ANION GAP SERPL CALC-SCNC: 15 MMOL/L — SIGNIFICANT CHANGE UP (ref 5–17)
BLD GP AB SCN SERPL QL: NEGATIVE — SIGNIFICANT CHANGE UP
BUN SERPL-MCNC: 15 MG/DL — SIGNIFICANT CHANGE UP (ref 7–23)
CALCIUM SERPL-MCNC: 9.2 MG/DL — SIGNIFICANT CHANGE UP (ref 8.4–10.5)
CHLORIDE SERPL-SCNC: 100 MMOL/L — SIGNIFICANT CHANGE UP (ref 96–108)
CO2 SERPL-SCNC: 24 MMOL/L — SIGNIFICANT CHANGE UP (ref 22–31)
CREAT SERPL-MCNC: 0.85 MG/DL — SIGNIFICANT CHANGE UP (ref 0.5–1.3)
CULTURE RESULTS: SIGNIFICANT CHANGE UP
CULTURE RESULTS: SIGNIFICANT CHANGE UP
GENTAMICIN PEAK SERPL-MCNC: 5 UG/ML — SIGNIFICANT CHANGE UP (ref 5–10)
GENTAMICIN TROUGH SERPL-MCNC: 1.2 UG/ML — SIGNIFICANT CHANGE UP (ref 0–2)
GLUCOSE BLDC GLUCOMTR-MCNC: 133 MG/DL — HIGH (ref 70–99)
GLUCOSE BLDC GLUCOMTR-MCNC: 135 MG/DL — HIGH (ref 70–99)
GLUCOSE BLDC GLUCOMTR-MCNC: 137 MG/DL — HIGH (ref 70–99)
GLUCOSE BLDC GLUCOMTR-MCNC: 190 MG/DL — HIGH (ref 70–99)
GLUCOSE SERPL-MCNC: 135 MG/DL — HIGH (ref 70–99)
HCT VFR BLD CALC: 36.6 % — LOW (ref 39–50)
HGB BLD-MCNC: 12.7 G/DL — LOW (ref 13–17)
MCHC RBC-ENTMCNC: 30.1 PG — SIGNIFICANT CHANGE UP (ref 27–34)
MCHC RBC-ENTMCNC: 34.8 GM/DL — SIGNIFICANT CHANGE UP (ref 32–36)
MCV RBC AUTO: 86.7 FL — SIGNIFICANT CHANGE UP (ref 80–100)
PLATELET # BLD AUTO: 221 K/UL — SIGNIFICANT CHANGE UP (ref 150–400)
POTASSIUM SERPL-MCNC: 4.1 MMOL/L — SIGNIFICANT CHANGE UP (ref 3.5–5.3)
POTASSIUM SERPL-SCNC: 4.1 MMOL/L — SIGNIFICANT CHANGE UP (ref 3.5–5.3)
RBC # BLD: 4.22 M/UL — SIGNIFICANT CHANGE UP (ref 4.2–5.8)
RBC # FLD: 13.5 % — SIGNIFICANT CHANGE UP (ref 10.3–14.5)
RH IG SCN BLD-IMP: POSITIVE — SIGNIFICANT CHANGE UP
SODIUM SERPL-SCNC: 139 MMOL/L — SIGNIFICANT CHANGE UP (ref 135–145)
SPECIMEN SOURCE: SIGNIFICANT CHANGE UP
SPECIMEN SOURCE: SIGNIFICANT CHANGE UP
WBC # BLD: 11.5 K/UL — HIGH (ref 3.8–10.5)
WBC # FLD AUTO: 11.5 K/UL — HIGH (ref 3.8–10.5)

## 2019-07-25 PROCEDURE — 50389 REMOVE RENAL TUBE W/FLUORO: CPT | Mod: LT,58

## 2019-07-25 PROCEDURE — 50081 PERQ NL/PL LITHOTRP CPLX>2CM: CPT | Mod: 58,LT

## 2019-07-25 PROCEDURE — 50433 PLMT NEPHROURETERAL CATHETER: CPT | Mod: LT,58

## 2019-07-25 PROCEDURE — 88300 SURGICAL PATH GROSS: CPT | Mod: 26

## 2019-07-25 PROCEDURE — 74420 UROGRAPHY RTRGR +-KUB: CPT | Mod: 26

## 2019-07-25 PROCEDURE — 50390 DRAINAGE OF KIDNEY LESION: CPT | Mod: LT,58

## 2019-07-25 RX ORDER — SODIUM CHLORIDE 9 MG/ML
1000 INJECTION, SOLUTION INTRAVENOUS
Refills: 0 | Status: DISCONTINUED | OUTPATIENT
Start: 2019-07-25 | End: 2019-07-25

## 2019-07-25 RX ORDER — METOPROLOL TARTRATE 50 MG
25 TABLET ORAL DAILY
Refills: 0 | Status: DISCONTINUED | OUTPATIENT
Start: 2019-07-25 | End: 2019-07-27

## 2019-07-25 RX ORDER — CEFAZOLIN SODIUM 1 G
1000 VIAL (EA) INJECTION EVERY 8 HOURS
Refills: 0 | Status: DISCONTINUED | OUTPATIENT
Start: 2019-07-25 | End: 2019-07-27

## 2019-07-25 RX ORDER — DEXTROSE 50 % IN WATER 50 %
25 SYRINGE (ML) INTRAVENOUS ONCE
Refills: 0 | Status: DISCONTINUED | OUTPATIENT
Start: 2019-07-25 | End: 2019-07-27

## 2019-07-25 RX ORDER — HYDROMORPHONE HYDROCHLORIDE 2 MG/ML
0.5 INJECTION INTRAMUSCULAR; INTRAVENOUS; SUBCUTANEOUS
Refills: 0 | Status: DISCONTINUED | OUTPATIENT
Start: 2019-07-25 | End: 2019-07-25

## 2019-07-25 RX ORDER — INSULIN LISPRO 100/ML
VIAL (ML) SUBCUTANEOUS
Refills: 0 | Status: DISCONTINUED | OUTPATIENT
Start: 2019-07-25 | End: 2019-07-27

## 2019-07-25 RX ORDER — DEXTROSE 50 % IN WATER 50 %
12.5 SYRINGE (ML) INTRAVENOUS ONCE
Refills: 0 | Status: DISCONTINUED | OUTPATIENT
Start: 2019-07-25 | End: 2019-07-27

## 2019-07-25 RX ORDER — MORPHINE SULFATE 50 MG/1
4 CAPSULE, EXTENDED RELEASE ORAL
Refills: 0 | Status: DISCONTINUED | OUTPATIENT
Start: 2019-07-25 | End: 2019-07-27

## 2019-07-25 RX ORDER — SENNA PLUS 8.6 MG/1
2 TABLET ORAL AT BEDTIME
Refills: 0 | Status: DISCONTINUED | OUTPATIENT
Start: 2019-07-25 | End: 2019-07-27

## 2019-07-25 RX ORDER — HEPARIN SODIUM 5000 [USP'U]/ML
5000 INJECTION INTRAVENOUS; SUBCUTANEOUS EVERY 8 HOURS
Refills: 0 | Status: DISCONTINUED | OUTPATIENT
Start: 2019-07-25 | End: 2019-07-27

## 2019-07-25 RX ORDER — INSULIN LISPRO 100/ML
VIAL (ML) SUBCUTANEOUS AT BEDTIME
Refills: 0 | Status: DISCONTINUED | OUTPATIENT
Start: 2019-07-25 | End: 2019-07-27

## 2019-07-25 RX ORDER — DEXTROSE 50 % IN WATER 50 %
15 SYRINGE (ML) INTRAVENOUS ONCE
Refills: 0 | Status: DISCONTINUED | OUTPATIENT
Start: 2019-07-25 | End: 2019-07-27

## 2019-07-25 RX ORDER — SODIUM CHLORIDE 9 MG/ML
1000 INJECTION INTRAMUSCULAR; INTRAVENOUS; SUBCUTANEOUS
Refills: 0 | Status: DISCONTINUED | OUTPATIENT
Start: 2019-07-25 | End: 2019-07-25

## 2019-07-25 RX ORDER — LOSARTAN POTASSIUM 100 MG/1
50 TABLET, FILM COATED ORAL DAILY
Refills: 0 | Status: DISCONTINUED | OUTPATIENT
Start: 2019-07-25 | End: 2019-07-27

## 2019-07-25 RX ORDER — LIDOCAINE HCL 20 MG/ML
0.2 VIAL (ML) INJECTION ONCE
Refills: 0 | Status: DISCONTINUED | OUTPATIENT
Start: 2019-07-25 | End: 2019-07-27

## 2019-07-25 RX ORDER — SODIUM CHLORIDE 9 MG/ML
3 INJECTION INTRAMUSCULAR; INTRAVENOUS; SUBCUTANEOUS EVERY 8 HOURS
Refills: 0 | Status: DISCONTINUED | OUTPATIENT
Start: 2019-07-25 | End: 2019-07-27

## 2019-07-25 RX ORDER — SODIUM CHLORIDE 9 MG/ML
1000 INJECTION INTRAMUSCULAR; INTRAVENOUS; SUBCUTANEOUS
Refills: 0 | Status: DISCONTINUED | OUTPATIENT
Start: 2019-07-25 | End: 2019-07-26

## 2019-07-25 RX ORDER — ONDANSETRON 8 MG/1
4 TABLET, FILM COATED ORAL ONCE
Refills: 0 | Status: DISCONTINUED | OUTPATIENT
Start: 2019-07-25 | End: 2019-07-25

## 2019-07-25 RX ORDER — ATORVASTATIN CALCIUM 80 MG/1
10 TABLET, FILM COATED ORAL AT BEDTIME
Refills: 0 | Status: DISCONTINUED | OUTPATIENT
Start: 2019-07-25 | End: 2019-07-27

## 2019-07-25 RX ORDER — GLUCAGON INJECTION, SOLUTION 0.5 MG/.1ML
1 INJECTION, SOLUTION SUBCUTANEOUS ONCE
Refills: 0 | Status: DISCONTINUED | OUTPATIENT
Start: 2019-07-25 | End: 2019-07-27

## 2019-07-25 RX ORDER — OXYCODONE HYDROCHLORIDE 5 MG/1
5 TABLET ORAL EVERY 4 HOURS
Refills: 0 | Status: DISCONTINUED | OUTPATIENT
Start: 2019-07-25 | End: 2019-07-27

## 2019-07-25 RX ORDER — ACETAMINOPHEN 500 MG
650 TABLET ORAL EVERY 6 HOURS
Refills: 0 | Status: DISCONTINUED | OUTPATIENT
Start: 2019-07-25 | End: 2019-07-27

## 2019-07-25 RX ADMIN — SODIUM CHLORIDE 100 MILLILITER(S): 9 INJECTION, SOLUTION INTRAVENOUS at 11:32

## 2019-07-25 RX ADMIN — Medication 25 MILLIGRAM(S): at 06:11

## 2019-07-25 RX ADMIN — Medication 200 MILLIGRAM(S): at 06:11

## 2019-07-25 RX ADMIN — MORPHINE SULFATE 4 MILLIGRAM(S): 50 CAPSULE, EXTENDED RELEASE ORAL at 05:15

## 2019-07-25 RX ADMIN — LOSARTAN POTASSIUM 50 MILLIGRAM(S): 100 TABLET, FILM COATED ORAL at 06:11

## 2019-07-25 RX ADMIN — MORPHINE SULFATE 4 MILLIGRAM(S): 50 CAPSULE, EXTENDED RELEASE ORAL at 04:53

## 2019-07-25 RX ADMIN — Medication 100 MILLIGRAM(S): at 11:16

## 2019-07-25 RX ADMIN — OXYCODONE HYDROCHLORIDE 5 MILLIGRAM(S): 5 TABLET ORAL at 17:43

## 2019-07-25 RX ADMIN — SODIUM CHLORIDE 3 MILLILITER(S): 9 INJECTION INTRAMUSCULAR; INTRAVENOUS; SUBCUTANEOUS at 13:11

## 2019-07-25 RX ADMIN — Medication 1: at 11:23

## 2019-07-25 RX ADMIN — HEPARIN SODIUM 5000 UNIT(S): 5000 INJECTION INTRAVENOUS; SUBCUTANEOUS at 13:48

## 2019-07-25 RX ADMIN — ATORVASTATIN CALCIUM 10 MILLIGRAM(S): 80 TABLET, FILM COATED ORAL at 22:51

## 2019-07-25 RX ADMIN — OXYCODONE HYDROCHLORIDE 5 MILLIGRAM(S): 5 TABLET ORAL at 17:13

## 2019-07-25 RX ADMIN — SODIUM CHLORIDE 3 MILLILITER(S): 9 INJECTION INTRAMUSCULAR; INTRAVENOUS; SUBCUTANEOUS at 22:52

## 2019-07-25 RX ADMIN — HEPARIN SODIUM 5000 UNIT(S): 5000 INJECTION INTRAVENOUS; SUBCUTANEOUS at 06:11

## 2019-07-25 RX ADMIN — HEPARIN SODIUM 5000 UNIT(S): 5000 INJECTION INTRAVENOUS; SUBCUTANEOUS at 22:51

## 2019-07-25 RX ADMIN — Medication 100 MILLIGRAM(S): at 06:11

## 2019-07-25 NOTE — BRIEF OPERATIVE NOTE - NSICDXBRIEFPOSTOP_GEN_ALL_CORE_FT
POST-OP DIAGNOSIS:  Renal stone 23-Jul-2019 20:57:48  Smith Padron
POST-OP DIAGNOSIS:  Renal stone 23-Jul-2019 20:57:48  Smith Padron

## 2019-07-25 NOTE — PROGRESS NOTE ADULT - ASSESSMENT
For stage 2 left pcnl in the am   Orders:  NPO   IVF  Consent obtained  Clearance as outpt   Type & Screen this am   prbc on hold

## 2019-07-25 NOTE — PROGRESS NOTE ADULT - SUBJECTIVE AND OBJECTIVE BOX
Subjective  feeling well without complaints   Objective    Vital signs  T(F): , Max: 99.2 (07-25-19 @ 01:36)  HR: 100 (07-25-19 @ 05:50)  BP: 124/80 (07-25-19 @ 05:50)  SpO2: 96% (07-25-19 @ 05:50)  Wt(kg): --    Output     07-24 @ 07:01  -  07-25 @ 07:00  --------------------------------------------------------  IN: 2505 mL / OUT: 3950 mL / NET: -1445 mL        Gen awake alert nad axox3  Abd obese soft ntnd   Back tube in place no hematoma/ ecchymosis  urine dilute cherry    nonpalp bladder     Labs      07-24 @ 10:55    WBC --    / Hct --    / SCr 1.57     07-24 @ 03:24    WBC 20.0  / Hct 39.0  / SCr 1.45     Imaging  < from: CT Abdomen and Pelvis No Cont (07.24.19 @ 09:56) >  FINDINGS:    LOWER CHEST: Within normal limits.    LIVER: Within normal limits.  BILE DUCTS: Normal caliber.  GALLBLADDER: Within normal limits.  SPLEEN: Within normal limits.  PANCREAS: Within normal limits.  ADRENALS: Within normal limits.  KIDNEYS/URETERS:     Right kidney/ureter: Staghorn calculus, similar to prior CT dated   2/24/2017. No hydroureteronephrosis. Stable 3.2 cm upper pole cyst.    Left kidney/ureter: Postprocedural inflammation and hematoma. There is   noted in the collecting system and perinephric space. Percutaneous   nephroureteral catheter in place. A 9.6 mm lower pole calculus and 2   punctate lower pole calculi remain, significantly reduced in size from   prior CT dated 2/24/2017.    BLADDER: Mild layering debris in the bladder.  REPRODUCTIVE ORGANS: Prostate is normal in size.    BOWEL: No bowel obstruction. Appendix is normal.  PERITONEUM: No ascites.  VESSELS: Atherosclerotic changes.  RETROPERITONEUM: No lymphadenopathy.    ABDOMINAL WALL: Within normal limits.  BONES: Unremarkable.    IMPRESSION:     Status postleft percutaneous nephrolithotomy with nephroureteral   catheter in place. Postprocedural inflammation and hematoma. Decreased   left renal stone burden.    Stable right renal staghorn calculus.    < end of copied text >

## 2019-07-25 NOTE — PROGRESS NOTE ADULT - SUBJECTIVE AND OBJECTIVE BOX
Post op Check    Pt seen and examined without complaints. Pain is controlled. Denies SOB/CP/N/V.     Vital Signs Last 24 Hrs  T(C): 37.1 (25 Jul 2019 16:01), Max: 37.3 (25 Jul 2019 01:36)  T(F): 98.7 (25 Jul 2019 16:01), Max: 99.2 (25 Jul 2019 01:36)  HR: 93 (25 Jul 2019 16:01) (79 - 100)  BP: 139/99 (25 Jul 2019 16:01) (105/68 - 149/92)  BP(mean): 92 (25 Jul 2019 12:00) (83 - 99)  RR: 18 (25 Jul 2019 16:01) (18 - 20)  SpO2: 97% (25 Jul 2019 16:01) (96% - 99%)    I&O's Summary    24 Jul 2019 07:01  -  25 Jul 2019 07:00  --------------------------------------------------------  IN: 2730 mL / OUT: 3950 mL / NET: -1220 mL    25 Jul 2019 07:01  -  25 Jul 2019 16:43  --------------------------------------------------------  IN: 490 mL / OUT: 600 mL / NET: -110 mL        Physical Exam  Gen: awake alert NAD AXOX3  Pulm: No respiratory distress, no subcostal retractions  CV: RRR, no JVD  Abd: Soft, NT, ND  Back: tube in place no hematoma/ ecchymosis  dressing saturated light pink urine   :  ham pink                           12.7   11.5  )-----------( 221      ( 25 Jul 2019 07:13 )             36.6       07-25    139  |  100  |  15  ----------------------------<  135<H>  4.1   |  24  |  0.85    Ca    9.2      25 Jul 2019 07:13

## 2019-07-25 NOTE — PROGRESS NOTE ADULT - ASSESSMENT
A/P: 59y Male s/p stage 2 left pcnl   tov and ct in am   DVT prophylaxis/OOB  Incentive spirometry  Strict I&O's  Analgesia and antiemetics as needed  Diet  AM labs

## 2019-07-25 NOTE — BRIEF OPERATIVE NOTE - NSICDXBRIEFPROCEDURE_GEN_ALL_CORE_FT
PROCEDURES:  Percutaneous removal of calculus of kidney, 2 cm or more in diameter 23-Jul-2019 20:57:33  Smith Padron
PROCEDURES:  Percutaneous removal of calculus of kidney, 2 cm or more in diameter 23-Jul-2019 20:57:33  Smith Padron

## 2019-07-25 NOTE — BRIEF OPERATIVE NOTE - OPERATION/FINDINGS
staghorn calculus; most of stone was removed however some stone remained; plan for 2nd stage    Dictation [id number]
2nd stage L PCNL    1:1:min

## 2019-07-25 NOTE — BRIEF OPERATIVE NOTE - NSICDXBRIEFPREOP_GEN_ALL_CORE_FT
PRE-OP DIAGNOSIS:  Renal stone 23-Jul-2019 20:57:44  Smith Padron
PRE-OP DIAGNOSIS:  Renal stone 23-Jul-2019 20:57:44  Smith Padron

## 2019-07-26 LAB
ANION GAP SERPL CALC-SCNC: 14 MMOL/L — SIGNIFICANT CHANGE UP (ref 5–17)
BUN SERPL-MCNC: 16 MG/DL — SIGNIFICANT CHANGE UP (ref 7–23)
CALCIUM SERPL-MCNC: 8.8 MG/DL — SIGNIFICANT CHANGE UP (ref 8.4–10.5)
CHLORIDE SERPL-SCNC: 103 MMOL/L — SIGNIFICANT CHANGE UP (ref 96–108)
CO2 SERPL-SCNC: 21 MMOL/L — LOW (ref 22–31)
CREAT SERPL-MCNC: 1.42 MG/DL — HIGH (ref 0.5–1.3)
GLUCOSE BLDC GLUCOMTR-MCNC: 144 MG/DL — HIGH (ref 70–99)
GLUCOSE BLDC GLUCOMTR-MCNC: 159 MG/DL — HIGH (ref 70–99)
GLUCOSE BLDC GLUCOMTR-MCNC: 171 MG/DL — HIGH (ref 70–99)
GLUCOSE BLDC GLUCOMTR-MCNC: 172 MG/DL — HIGH (ref 70–99)
GLUCOSE SERPL-MCNC: 158 MG/DL — HIGH (ref 70–99)
HCT VFR BLD CALC: 36.1 % — LOW (ref 39–50)
HGB BLD-MCNC: 12.3 G/DL — LOW (ref 13–17)
MCHC RBC-ENTMCNC: 29.6 PG — SIGNIFICANT CHANGE UP (ref 27–34)
MCHC RBC-ENTMCNC: 34 GM/DL — SIGNIFICANT CHANGE UP (ref 32–36)
MCV RBC AUTO: 86.8 FL — SIGNIFICANT CHANGE UP (ref 80–100)
PLATELET # BLD AUTO: 214 K/UL — SIGNIFICANT CHANGE UP (ref 150–400)
POTASSIUM SERPL-MCNC: 4.1 MMOL/L — SIGNIFICANT CHANGE UP (ref 3.5–5.3)
POTASSIUM SERPL-SCNC: 4.1 MMOL/L — SIGNIFICANT CHANGE UP (ref 3.5–5.3)
RBC # BLD: 4.16 M/UL — LOW (ref 4.2–5.8)
RBC # FLD: 13.2 % — SIGNIFICANT CHANGE UP (ref 10.3–14.5)
SODIUM SERPL-SCNC: 138 MMOL/L — SIGNIFICANT CHANGE UP (ref 135–145)
WBC # BLD: 15.6 K/UL — HIGH (ref 3.8–10.5)
WBC # FLD AUTO: 15.6 K/UL — HIGH (ref 3.8–10.5)

## 2019-07-26 PROCEDURE — 74176 CT ABD & PELVIS W/O CONTRAST: CPT | Mod: 26

## 2019-07-26 RX ORDER — MULTIVIT WITH MIN/MFOLATE/K2 340-15/3 G
1 POWDER (GRAM) ORAL ONCE
Refills: 0 | Status: COMPLETED | OUTPATIENT
Start: 2019-07-26 | End: 2019-07-26

## 2019-07-26 RX ORDER — POLYETHYLENE GLYCOL 3350 17 G/17G
17 POWDER, FOR SOLUTION ORAL ONCE
Refills: 0 | Status: COMPLETED | OUTPATIENT
Start: 2019-07-26 | End: 2019-07-26

## 2019-07-26 RX ORDER — MINERAL OIL
133 OIL (ML) MISCELLANEOUS ONCE
Refills: 0 | Status: COMPLETED | OUTPATIENT
Start: 2019-07-26 | End: 2019-07-26

## 2019-07-26 RX ORDER — DOCUSATE SODIUM 100 MG
100 CAPSULE ORAL THREE TIMES A DAY
Refills: 0 | Status: DISCONTINUED | OUTPATIENT
Start: 2019-07-26 | End: 2019-07-27

## 2019-07-26 RX ORDER — SODIUM CHLORIDE 9 MG/ML
1000 INJECTION, SOLUTION INTRAVENOUS
Refills: 0 | Status: DISCONTINUED | OUTPATIENT
Start: 2019-07-26 | End: 2019-07-27

## 2019-07-26 RX ADMIN — Medication 1: at 18:22

## 2019-07-26 RX ADMIN — SODIUM CHLORIDE 3 MILLILITER(S): 9 INJECTION INTRAMUSCULAR; INTRAVENOUS; SUBCUTANEOUS at 05:21

## 2019-07-26 RX ADMIN — Medication 1: at 14:17

## 2019-07-26 RX ADMIN — Medication 100 MILLIGRAM(S): at 22:10

## 2019-07-26 RX ADMIN — Medication 100 MILLIGRAM(S): at 14:38

## 2019-07-26 RX ADMIN — MORPHINE SULFATE 4 MILLIGRAM(S): 50 CAPSULE, EXTENDED RELEASE ORAL at 01:28

## 2019-07-26 RX ADMIN — Medication 25 MILLIGRAM(S): at 05:21

## 2019-07-26 RX ADMIN — LOSARTAN POTASSIUM 50 MILLIGRAM(S): 100 TABLET, FILM COATED ORAL at 05:21

## 2019-07-26 RX ADMIN — HEPARIN SODIUM 5000 UNIT(S): 5000 INJECTION INTRAVENOUS; SUBCUTANEOUS at 22:10

## 2019-07-26 RX ADMIN — Medication 100 MILLIGRAM(S): at 14:33

## 2019-07-26 RX ADMIN — POLYETHYLENE GLYCOL 3350 17 GRAM(S): 17 POWDER, FOR SOLUTION ORAL at 14:38

## 2019-07-26 RX ADMIN — SODIUM CHLORIDE 75 MILLILITER(S): 9 INJECTION, SOLUTION INTRAVENOUS at 08:42

## 2019-07-26 RX ADMIN — MORPHINE SULFATE 4 MILLIGRAM(S): 50 CAPSULE, EXTENDED RELEASE ORAL at 05:30

## 2019-07-26 RX ADMIN — HEPARIN SODIUM 5000 UNIT(S): 5000 INJECTION INTRAVENOUS; SUBCUTANEOUS at 14:38

## 2019-07-26 RX ADMIN — HEPARIN SODIUM 5000 UNIT(S): 5000 INJECTION INTRAVENOUS; SUBCUTANEOUS at 05:20

## 2019-07-26 RX ADMIN — SENNA PLUS 2 TABLET(S): 8.6 TABLET ORAL at 06:45

## 2019-07-26 RX ADMIN — Medication 1: at 09:27

## 2019-07-26 RX ADMIN — POLYETHYLENE GLYCOL 3350 17 GRAM(S): 17 POWDER, FOR SOLUTION ORAL at 18:24

## 2019-07-26 RX ADMIN — ATORVASTATIN CALCIUM 10 MILLIGRAM(S): 80 TABLET, FILM COATED ORAL at 22:10

## 2019-07-26 RX ADMIN — MORPHINE SULFATE 4 MILLIGRAM(S): 50 CAPSULE, EXTENDED RELEASE ORAL at 03:32

## 2019-07-26 RX ADMIN — Medication 100 MILLIGRAM(S): at 03:28

## 2019-07-26 RX ADMIN — SODIUM CHLORIDE 3 MILLILITER(S): 9 INJECTION INTRAMUSCULAR; INTRAVENOUS; SUBCUTANEOUS at 13:30

## 2019-07-26 NOTE — PROGRESS NOTE ADULT - SUBJECTIVE AND OBJECTIVE BOX
Subjective  feeling well with mild pain at nt site; has not had bm   Objective    Vital signs  T(F): , Max: 99.2 (07-26-19 @ 01:20)  HR: 93 (07-26-19 @ 04:50)  BP: 146/97 (07-26-19 @ 04:50)  SpO2: 97% (07-26-19 @ 04:50)  Wt(kg): --    Output     07-25 @ 07:01  -  07-26 @ 07:00  --------------------------------------------------------  IN: 750 mL / OUT: 2875 mL / NET: -2125 mL        Gen awake alert nad axox3  Abd soft ntnd   Back tube in place no hematoma/ ecchymosis  color dilute cherry    ham yellow     Labs      07-25 @ 07:13    WBC 11.5  / Hct 36.6  / SCr 0.85     07-24 @ 10:55    WBC --    / Hct --    / SCr 1.57       Imaging  p

## 2019-07-26 NOTE — PROGRESS NOTE ADULT - ASSESSMENT
A/P: 59y Male s/p stage 2 left pcnl POD 1  enema   tov underway   ct today  likely 2 day plan   DVT prophylaxis/OOB  Incentive spirometry  Strict I&O's  Analgesia and antiemetics as needed  Diet  AM labs A/P: 59y Male s/p stage 2 left pcnl POD 1  iliana attributed to surgery   enema   tov underway   ct today  likely 2 day plan   DVT prophylaxis/OOB  Incentive spirometry  Strict I&O's  Analgesia and antiemetics as needed  Diet  AM labs

## 2019-07-27 ENCOUNTER — TRANSCRIPTION ENCOUNTER (OUTPATIENT)
Age: 59
End: 2019-07-27

## 2019-07-27 VITALS
HEART RATE: 84 BPM | TEMPERATURE: 98 F | OXYGEN SATURATION: 98 % | SYSTOLIC BLOOD PRESSURE: 120 MMHG | DIASTOLIC BLOOD PRESSURE: 84 MMHG | RESPIRATION RATE: 18 BRPM

## 2019-07-27 LAB
ANION GAP SERPL CALC-SCNC: 14 MMOL/L — SIGNIFICANT CHANGE UP (ref 5–17)
BUN SERPL-MCNC: 14 MG/DL — SIGNIFICANT CHANGE UP (ref 7–23)
CALCIUM SERPL-MCNC: 8.7 MG/DL — SIGNIFICANT CHANGE UP (ref 8.4–10.5)
CHLORIDE SERPL-SCNC: 103 MMOL/L — SIGNIFICANT CHANGE UP (ref 96–108)
CO2 SERPL-SCNC: 22 MMOL/L — SIGNIFICANT CHANGE UP (ref 22–31)
COMPN STONE: SIGNIFICANT CHANGE UP
CREAT SERPL-MCNC: 1.34 MG/DL — HIGH (ref 0.5–1.3)
GLUCOSE BLDC GLUCOMTR-MCNC: 127 MG/DL — HIGH (ref 70–99)
GLUCOSE BLDC GLUCOMTR-MCNC: 171 MG/DL — HIGH (ref 70–99)
GLUCOSE SERPL-MCNC: 146 MG/DL — HIGH (ref 70–99)
HCT VFR BLD CALC: 34.8 % — LOW (ref 39–50)
HGB BLD-MCNC: 11.7 G/DL — LOW (ref 13–17)
MCHC RBC-ENTMCNC: 29.2 PG — SIGNIFICANT CHANGE UP (ref 27–34)
MCHC RBC-ENTMCNC: 33.6 GM/DL — SIGNIFICANT CHANGE UP (ref 32–36)
MCV RBC AUTO: 86.9 FL — SIGNIFICANT CHANGE UP (ref 80–100)
PLATELET # BLD AUTO: 207 K/UL — SIGNIFICANT CHANGE UP (ref 150–400)
POTASSIUM SERPL-MCNC: 3.8 MMOL/L — SIGNIFICANT CHANGE UP (ref 3.5–5.3)
POTASSIUM SERPL-SCNC: 3.8 MMOL/L — SIGNIFICANT CHANGE UP (ref 3.5–5.3)
RBC # BLD: 4 M/UL — LOW (ref 4.2–5.8)
RBC # FLD: 13 % — SIGNIFICANT CHANGE UP (ref 10.3–14.5)
SODIUM SERPL-SCNC: 139 MMOL/L — SIGNIFICANT CHANGE UP (ref 135–145)
WBC # BLD: 10.4 K/UL — SIGNIFICANT CHANGE UP (ref 3.8–10.5)
WBC # FLD AUTO: 10.4 K/UL — SIGNIFICANT CHANGE UP (ref 3.8–10.5)

## 2019-07-27 PROCEDURE — 93005 ELECTROCARDIOGRAM TRACING: CPT

## 2019-07-27 PROCEDURE — C1769: CPT

## 2019-07-27 PROCEDURE — C1758: CPT

## 2019-07-27 PROCEDURE — 87086 URINE CULTURE/COLONY COUNT: CPT

## 2019-07-27 PROCEDURE — C1889: CPT

## 2019-07-27 PROCEDURE — 86901 BLOOD TYPING SEROLOGIC RH(D): CPT

## 2019-07-27 PROCEDURE — 80048 BASIC METABOLIC PNL TOTAL CA: CPT

## 2019-07-27 PROCEDURE — C1729: CPT

## 2019-07-27 PROCEDURE — 80170 ASSAY OF GENTAMICIN: CPT

## 2019-07-27 PROCEDURE — 88300 SURGICAL PATH GROSS: CPT

## 2019-07-27 PROCEDURE — 74176 CT ABD & PELVIS W/O CONTRAST: CPT

## 2019-07-27 PROCEDURE — 71045 X-RAY EXAM CHEST 1 VIEW: CPT

## 2019-07-27 PROCEDURE — 82365 CALCULUS SPECTROSCOPY: CPT

## 2019-07-27 PROCEDURE — C1726: CPT

## 2019-07-27 PROCEDURE — 86900 BLOOD TYPING SEROLOGIC ABO: CPT

## 2019-07-27 PROCEDURE — 85027 COMPLETE CBC AUTOMATED: CPT

## 2019-07-27 PROCEDURE — 76000 FLUOROSCOPY <1 HR PHYS/QHP: CPT

## 2019-07-27 PROCEDURE — 82962 GLUCOSE BLOOD TEST: CPT

## 2019-07-27 PROCEDURE — 87070 CULTURE OTHR SPECIMN AEROBIC: CPT

## 2019-07-27 RX ORDER — ACETAMINOPHEN 500 MG
2 TABLET ORAL
Qty: 0 | Refills: 0 | DISCHARGE
Start: 2019-07-27

## 2019-07-27 RX ORDER — DOCUSATE SODIUM 100 MG
1 CAPSULE ORAL
Qty: 0 | Refills: 0 | DISCHARGE
Start: 2019-07-27

## 2019-07-27 RX ORDER — SENNA PLUS 8.6 MG/1
2 TABLET ORAL
Qty: 0 | Refills: 0 | DISCHARGE
Start: 2019-07-27

## 2019-07-27 RX ORDER — CEPHALEXIN 500 MG
1 CAPSULE ORAL
Qty: 6 | Refills: 0
Start: 2019-07-27 | End: 2019-07-29

## 2019-07-27 RX ADMIN — Medication 1: at 09:26

## 2019-07-27 RX ADMIN — LOSARTAN POTASSIUM 50 MILLIGRAM(S): 100 TABLET, FILM COATED ORAL at 05:47

## 2019-07-27 RX ADMIN — Medication 100 MILLIGRAM(S): at 12:31

## 2019-07-27 RX ADMIN — MORPHINE SULFATE 4 MILLIGRAM(S): 50 CAPSULE, EXTENDED RELEASE ORAL at 09:40

## 2019-07-27 RX ADMIN — HEPARIN SODIUM 5000 UNIT(S): 5000 INJECTION INTRAVENOUS; SUBCUTANEOUS at 15:03

## 2019-07-27 RX ADMIN — Medication 100 MILLIGRAM(S): at 05:46

## 2019-07-27 RX ADMIN — SODIUM CHLORIDE 75 MILLILITER(S): 9 INJECTION, SOLUTION INTRAVENOUS at 09:27

## 2019-07-27 RX ADMIN — Medication 100 MILLIGRAM(S): at 15:02

## 2019-07-27 RX ADMIN — Medication 25 MILLIGRAM(S): at 05:46

## 2019-07-27 RX ADMIN — HEPARIN SODIUM 5000 UNIT(S): 5000 INJECTION INTRAVENOUS; SUBCUTANEOUS at 05:46

## 2019-07-27 RX ADMIN — MORPHINE SULFATE 4 MILLIGRAM(S): 50 CAPSULE, EXTENDED RELEASE ORAL at 09:24

## 2019-07-27 RX ADMIN — SODIUM CHLORIDE 3 MILLILITER(S): 9 INJECTION INTRAMUSCULAR; INTRAVENOUS; SUBCUTANEOUS at 15:01

## 2019-07-27 NOTE — DISCHARGE NOTE NURSING/CASE MANAGEMENT/SOCIAL WORK - NSDCPEWEB_GEN_ALL_CORE
[place] website --- [email or web address] Tobacco [email or web address] website --- [email or web address]

## 2019-07-27 NOTE — DISCHARGE NOTE NURSING/CASE MANAGEMENT/SOCIAL WORK - NSDCDPATPORTLINK_GEN_ALL_CORE
You can access the [facility] [facility] Patient Portal, offered by [facility], by registering with the following website: [email or web address]

## 2019-07-27 NOTE — PROGRESS NOTE ADULT - ASSESSMENT
59 year old male s/p left PCNL POD#4    -AM labs  -recheck NT color later this AM and remove if clears  -f/u GI function  -OOB, ambulate  -discharge planning

## 2019-07-27 NOTE — DISCHARGE NOTE PROVIDER - NSDCCPCAREPLAN_GEN_ALL_CORE_FT
PRINCIPAL DISCHARGE DIAGNOSIS  Diagnosis: Nephrolithiasis  Assessment and Plan of Treatment: You underwent a left PCNL twice and had the stones removed from the left kidney.  Your nephrostomy tube was removed right before you were sent home.  Change the dressings with gauze and tegaderm OR tape to keep the wound site dry.  Drink plenty of water to clear the blood in the urine.  Follow up with Dr García in office [phone] within 2 weeks.  Call the office and speak to the emergency line if the blood in your urine worsens, or if you develop fevers greater than 101F.      SECONDARY DISCHARGE DIAGNOSES  Diagnosis: Cerebrovascular accident (CVA)  Assessment and Plan of Treatment:     Diagnosis: HLD (hyperlipidemia)  Assessment and Plan of Treatment: continue atorvastatin    Diagnosis: HTN (hypertension)  Assessment and Plan of Treatment: continue losartan and metoprolol    Diagnosis: Diabetes mellitus  Assessment and Plan of Treatment: continue metformin, maintain diabetic diet, and control your glucose

## 2019-07-27 NOTE — DISCHARGE NOTE PROVIDER - NSDCACTIVITY_GEN_ALL_CORE
Walking - Outdoors allowed/Stairs allowed/Walking - Indoors allowed/No heavy lifting/straining/Showering allowed

## 2019-07-27 NOTE — DISCHARGE NOTE PROVIDER - NSDCCPTREATMENT_GEN_ALL_CORE_FT
PRINCIPAL PROCEDURE  Procedure: Percutaneous removal of calculus of kidney, 2 cm or more in diameter  Findings and Treatment: left side

## 2019-07-27 NOTE — DISCHARGE NOTE NURSING/CASE MANAGEMENT/SOCIAL WORK - NSDCPEEMAIL_GEN_ALL_CORE
[facility] for Tobacco Control email [email or web address]

## 2019-07-27 NOTE — DISCHARGE NOTE PROVIDER - CARE PROVIDER_API CALL
Reuben García)  Urology  [address] [place] [address]  Phone: [phone]  Fax: [phone]  Follow Up Time: 2 weeks

## 2019-07-27 NOTE — DISCHARGE NOTE PROVIDER - HOSPITAL COURSE
This is a 59 year old male who underwent a scheduled left percutaneous nephrostolithotomy.  He remained stable throughout his hospital stay.  POD#1 CT scan showed residual calculi, and he underwent a second stage PCNL on 7/25.  The day after, his catheter was removed, and he voiding without retention.  A CT was repeated, demonstrating removal of all stones in the left kidney.  Patient initially had issues making a bowel movement, that were resolved.  Left nephrostomy tube was removed 7/27 with no complications.  He was discharged home with appropriate prescriptions and instructions for follow up. This is a 59 year old male who underwent a scheduled left percutaneous nephrostolithotomy.  He remained stable throughout his hospital stay.  POD#1 CT scan showed residual calculi, and he underwent a second stage PCNL on 7/25.  The day after, his catheter was removed, and he voiding without retention.  A CT was repeated, demonstrating removal of all stones in the left kidney.  +GI function with gas.  Left nephrostomy tube was removed 7/27 with no complications.  He was discharged home with appropriate prescriptions and instructions for follow up.

## 2019-07-27 NOTE — PROGRESS NOTE ADULT - ATTENDING COMMENTS
Patient seen and examined.  Agree with above.  nephroureteral tube urine clearing, now light punch.  Ok to remove nephroureteral tube.

## 2019-07-27 NOTE — PROGRESS NOTE ADULT - SUBJECTIVE AND OBJECTIVE BOX
The patient was seen and examined at bedside.  Denies complaints of chest pain, shortness of breath, nausea, acute pain.    T(C): 37.8 (07-27-19 @ 05:19), Max: 37.8 (07-27-19 @ 05:19)  HR: 91 (07-27-19 @ 05:19) (62 - 95)  BP: 122/83 (07-27-19 @ 05:19) (121/81 - 134/93)  RR: 18 (07-27-19 @ 05:19) (18 - 18)  SpO2: 95% (07-27-19 @ 05:19) (95% - 99%)  Wt(kg): --    Physical Exam:    General: NAD, A+Ox3  Abdomen: soft, non-tender, non-distended  Back: dressing clean/dry/intact      07-26 @ 07:01  -  07-27 @ 07:00  --------------------------------------------------------  IN: 2710 mL / OUT: 2875 mL / NET: -165 mL    void - 1050cc dark red    L NT - 175cc dark murky red

## 2019-07-29 LAB — COMPN STONE: SIGNIFICANT CHANGE UP

## 2019-08-01 LAB
SURGICAL PATHOLOGY STUDY: SIGNIFICANT CHANGE UP
SURGICAL PATHOLOGY STUDY: SIGNIFICANT CHANGE UP

## 2019-08-07 PROBLEM — L30.9 DERMATITIS, UNSPECIFIED: Chronic | Status: ACTIVE | Noted: 2019-07-15

## 2019-08-07 PROBLEM — F17.200 NICOTINE DEPENDENCE, UNSPECIFIED, UNCOMPLICATED: Chronic | Status: ACTIVE | Noted: 2019-07-15

## 2019-08-07 PROBLEM — I63.9 CEREBRAL INFARCTION, UNSPECIFIED: Chronic | Status: ACTIVE | Noted: 2019-07-15

## 2019-08-07 PROBLEM — E11.9 TYPE 2 DIABETES MELLITUS WITHOUT COMPLICATIONS: Chronic | Status: ACTIVE | Noted: 2017-02-13

## 2019-08-14 ENCOUNTER — APPOINTMENT (OUTPATIENT)
Dept: UROLOGY | Facility: CLINIC | Age: 59
End: 2019-08-14
Payer: COMMERCIAL

## 2019-08-14 VITALS
HEIGHT: 73 IN | WEIGHT: 202 LBS | HEART RATE: 81 BPM | RESPIRATION RATE: 17 BRPM | DIASTOLIC BLOOD PRESSURE: 100 MMHG | TEMPERATURE: 98.4 F | SYSTOLIC BLOOD PRESSURE: 143 MMHG | BODY MASS INDEX: 26.77 KG/M2

## 2019-08-14 PROCEDURE — 99024 POSTOP FOLLOW-UP VISIT: CPT

## 2019-08-14 NOTE — HISTORY OF PRESENT ILLNESS
[FreeTextEntry1] : s/p  two stage LEFT PCNL\par Stone = 100% Calcium phosphate (apatite).\par Also has a full staghorn on the Right side.\par \par Doing well\par nephrostomy site healed well\par \par plan:\par 2x24 hr urines ordered\par f/u 2 months to review results\par Will discuss and schedule Right PCNL after next visit

## 2019-08-15 ENCOUNTER — APPOINTMENT (OUTPATIENT)
Dept: UROLOGY | Facility: CLINIC | Age: 59
End: 2019-08-15

## 2019-11-06 ENCOUNTER — APPOINTMENT (OUTPATIENT)
Dept: UROLOGY | Facility: CLINIC | Age: 59
End: 2019-11-06

## 2020-01-14 NOTE — ASU PATIENT PROFILE, ADULT - ALCOHOL USE HISTORY SINGLE SELECT
never Comment: Did not fully address but counseled patient that we are happy to provide her with a FBSE if she desires to schedule. Detail Level: Zone

## 2020-03-16 NOTE — ASU PREOP CHECKLIST - CHLOROHEXIDINE WASH IN ASU
Patient requesting refill: metformin   Last OV: 9/25/19  Next OV: 4/1/20  Last refill: 9/25/19  Last labs: 9/19/19      Can be refilled per protocol.    23-Jul-2019

## 2020-06-04 NOTE — H&P PST ADULT - NS PRO AD NO ADVANCE DIRECTIVE
Chief Complaint   Patient presents with   • Right Hand - Follow-up       History  The patient is here today for follow up on his leg pain and its possible relationship to the Cipro he took about 2 or 3 weeks ago.  I saw him in the office last week.  I also talked to them on the telephone once since then.  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  According to the patient he developed a wound infection and was started on Keflex.  When he did not respond to the Keflex he was switched to doxycycline and then to Cipro.  He took 2 doses of Cipro which she said made his infection better but he then developed pain first in his medial gastroc and then in his right gastroc the next day.  He looked up Cipro on the Internet and found its relationship to muscle pain and to tendinitis.  He stopped the Cipro and switch back to doxycycline.    He says his left calf is no longer painful or tender.  He still has some discomfort in the medial midportion of the right gastroc just behind the subcutaneous border of the tibia.  He notices a small lump there.  He also had one episode of tenderness and discomfort in the left Achilles which she said lasted \"for about 2 seconds and then went away completely.\"  He has also been experiencing some mild aching in both knees with no swelling, redness or warmth.  He says this is worse with humidity.  He is wondering if the effects of Cipro could be multiplied by the fact that he has osteoporosis and is diabetic.  He wonders if Cipro can cause rheumatoid arthritis    The review of systems, past medical history, allergies and medications were reviewed with the patient and on the patient's intake forms.    Physical Examination  Constitutional: Alert, oriented and in no acute distress  Skin: Warm, dry, intact without rash or lesion.  HEENT: Normal  Pulmonary: No labored respirations  Cardiac:  Normal rate and intact distal pulses  Psychiatric:  Normal  affect    Musculoskeletal  Examination of both knees today is normal.  The knee has a full range of motion with stable ligaments.  There is no effusion.  The Lachman, anterior drawer, posterior drawer and Allan tests are negative.  The patellar apprehension signs are negative.  The extensor mechanism is intact.  There is no tenderness to palpation at either joint line or over either collateral ligament.  The hip has a full painless range of motion.   There is normal sensation and circulation.    There is a nontender lump in the medial right gastroc which is approximately a centimeter long and a half a centimeter in diameter.  This moves with a flexor tendons as he plantar flexes and dorsiflexes his toes and ankle.  There is no swelling or tenderness whatsoever in the Achilles tendon on the left or right.  Homans sign is negative bilaterally.    Imaging Reading/Results  No x-rays were taken today    Impression  I explained to the patient that the relationship between Cipro and tendinitis or muscle soreness is unclear but is reported.  There is also a association between Cipro and Achilles tendon ruptures.  I told him there was no relationship between Cipro and osteoarthritis, osteoporosis, diabetes or rheumatoid arthritis.  He asked how long this would problem for him.  I told him that much of what he is experiencing are age-related aches and pains and most likely not related to the Cipro at all.  He has been wrapping his legs with Ace bandages.  I told him he can continue to do this for comfort and support if he finds it beneficial but can stop at any time.  His normal daily activity level never goes beyond walking around.  He does not play sports of any sort.  I do not think he needs to limit his activity other than for discomfort.    I told him if his symptoms persist he should discuss this with his primary care physician.  I answered all his questions as best I could today.  He indicated he understood  everything we discussed.    (This document was created using Dragon Speech Recognition technology.  If a word or phrase is confusing, it is likely due to a speech recognition error.  Please feel free to contact [facility] if you have any questions regarding the content of this dictation)      Assessment:  Problem List Items Addressed This Visit     None      Visit Diagnoses     Neuropathy    -  Primary    Relevant Orders    EMG - ROUTINE          Schedule follow up: as needed    Danny Rodas MD   No

## 2022-03-08 NOTE — PRE-ANESTHESIA EVALUATION ADULT - WEIGHT IN KG
98.4
98.4
Detail Level: Simple
Render Risk Assessment In Note?: no
Additional Notes: Will observe the area.

## 2022-05-04 NOTE — PATIENT PROFILE ADULT - PRIMARY SOURCE OF SUPPORT/COMFORT
Pt. Called request to cancel RASHID scheduled 5-5-22. Pt. Stated that she prefers to get a 2nd opinon before having procedure and [facility] will [facility] in [place] for appt.   parent/significant other

## 2023-03-20 ENCOUNTER — OUTPATIENT (OUTPATIENT)
Dept: OUTPATIENT SERVICES | Facility: HOSPITAL | Age: 63
LOS: 1 days | Discharge: HOME | End: 2023-03-20
Payer: COMMERCIAL

## 2023-03-20 DIAGNOSIS — Z98.890 OTHER SPECIFIED POSTPROCEDURAL STATES: Chronic | ICD-10-CM

## 2023-03-20 LAB
ANION GAP SERPL CALC-SCNC: 13 MMOL/L — SIGNIFICANT CHANGE UP (ref 7–14)
BUN SERPL-MCNC: 18 MG/DL — SIGNIFICANT CHANGE UP (ref 7–23)
CALCIUM SERPL-MCNC: 9.2 MG/DL — SIGNIFICANT CHANGE UP (ref 8.4–10.5)
CHLORIDE SERPL-SCNC: 105 MMOL/L — SIGNIFICANT CHANGE UP (ref 98–107)
CO2 SERPL-SCNC: 21 MMOL/L — LOW (ref 22–31)
CREAT SERPL-MCNC: 1.5 MG/DL — HIGH (ref 0.5–1.3)
EGFR: 52 ML/MIN/1.73M2 — LOW
GLUCOSE SERPL-MCNC: 191 MG/DL — HIGH (ref 70–99)
HCT VFR BLD CALC: 40.5 % — SIGNIFICANT CHANGE UP (ref 39–50)
HGB BLD-MCNC: 13.3 G/DL — SIGNIFICANT CHANGE UP (ref 13–17)
MAGNESIUM SERPL-MCNC: 1.6 MG/DL — SIGNIFICANT CHANGE UP (ref 1.6–2.6)
MCHC RBC-ENTMCNC: 28.2 PG — SIGNIFICANT CHANGE UP (ref 27–34)
MCHC RBC-ENTMCNC: 32.8 GM/DL — SIGNIFICANT CHANGE UP (ref 32–36)
MCV RBC AUTO: 86 FL — SIGNIFICANT CHANGE UP (ref 80–100)
NRBC # BLD: 0 /100 WBCS — SIGNIFICANT CHANGE UP (ref 0–0)
NRBC # FLD: 0 K/UL — SIGNIFICANT CHANGE UP (ref 0–0)
PLATELET # BLD AUTO: 300 K/UL — SIGNIFICANT CHANGE UP (ref 150–400)
POTASSIUM SERPL-MCNC: 4.1 MMOL/L — SIGNIFICANT CHANGE UP (ref 3.5–5.3)
POTASSIUM SERPL-SCNC: 4.1 MMOL/L — SIGNIFICANT CHANGE UP (ref 3.5–5.3)
RBC # BLD: 4.71 M/UL — SIGNIFICANT CHANGE UP (ref 4.2–5.8)
RBC # FLD: 13.8 % — SIGNIFICANT CHANGE UP (ref 10.3–14.5)
SODIUM SERPL-SCNC: 139 MMOL/L — SIGNIFICANT CHANGE UP (ref 135–145)
WBC # BLD: 8.76 K/UL — SIGNIFICANT CHANGE UP (ref 3.8–10.5)
WBC # FLD AUTO: 8.76 K/UL — SIGNIFICANT CHANGE UP (ref 3.8–10.5)

## 2023-03-20 PROCEDURE — 99152 MOD SED SAME PHYS/QHP 5/>YRS: CPT

## 2023-03-20 PROCEDURE — 93454 CORONARY ARTERY ANGIO S&I: CPT | Mod: 26

## 2023-03-20 PROCEDURE — 93010 ELECTROCARDIOGRAM REPORT: CPT

## 2023-03-20 RX ORDER — SODIUM CHLORIDE 9 MG/ML
3 INJECTION INTRAMUSCULAR; INTRAVENOUS; SUBCUTANEOUS EVERY 8 HOURS
Refills: 0 | Status: DISCONTINUED | OUTPATIENT
Start: 2023-03-20 | End: 2023-04-03

## 2023-03-20 RX ORDER — FLUTICASONE PROPIONATE 0.5 MG/G
1 CREAM TOPICAL
Qty: 0 | Refills: 0 | DISCHARGE

## 2023-03-20 RX ORDER — SODIUM CHLORIDE 9 MG/ML
1000 INJECTION INTRAMUSCULAR; INTRAVENOUS; SUBCUTANEOUS
Refills: 0 | Status: DISCONTINUED | OUTPATIENT
Start: 2023-03-20 | End: 2023-04-03

## 2023-03-20 RX ORDER — ASPIRIN/CALCIUM CARB/MAGNESIUM 324 MG
1 TABLET ORAL
Qty: 0 | Refills: 0 | DISCHARGE

## 2023-03-20 RX ORDER — LOSARTAN POTASSIUM 100 MG/1
1 TABLET, FILM COATED ORAL
Qty: 0 | Refills: 0 | DISCHARGE

## 2023-03-20 RX ADMIN — SODIUM CHLORIDE 75 MILLILITER(S): 9 INJECTION INTRAMUSCULAR; INTRAVENOUS; SUBCUTANEOUS at 11:06

## 2023-03-20 NOTE — H&P CARDIOLOGY - NSICDXPASTMEDICALHX_GEN_ALL_CORE_FT
PAST MEDICAL HISTORY:  Cerebrovascular accident (CVA) 2009 - no residuals    DM (diabetes mellitus) type 2    Eczema left arm    High blood pressure     Smoker for 45 years, down to 1/2 a pack daily

## 2023-03-20 NOTE — H&P CARDIOLOGY - OTHER
03/07/23 NST: There is a large severe, defect involving the entire inferolateral wall with reversibility in mid inferolateral wall segment, consistent with infarction with significant beck-infarct ischemia.

## 2023-03-20 NOTE — H&P CARDIOLOGY - HISTORY OF PRESENT ILLNESS
61 y/o M with PMH of CVA( 10 years ago without any residual), HTN, HLD, DM type II, Chronic everyday smoker, NICM(with EF of 32%), taking Eliquis for a/c started by his cardiologist, patient is unsure why he is taking Eliquis presented to [facility] for C for abnormal stress test. Patient stated that he had a routine follow up with a cardiologist since he hasn't seen a cardiologist after his stroke. Patient denied any current complaints and had a stress test which was abnormal. Patient denied any CP, SOB, fevers, chills, N/V/D/C, abdominal pain, dysuria, melena, hematochezia, recent travel, sick contact, cough, body aches, pleuritic or positional chest pain.

## 2023-05-03 NOTE — ED ADULT NURSE NOTE - CAS EDN DISCHARGE ASSESSMENT
Bigfork Valley 
Alert and oriented to person, place and time

## 2023-06-02 ENCOUNTER — INPATIENT (INPATIENT)
Facility: HOSPITAL | Age: 63
LOS: 2 days | Discharge: HOME | End: 2023-06-05
Attending: INTERNAL MEDICINE | Admitting: INTERNAL MEDICINE
Payer: COMMERCIAL

## 2023-06-02 VITALS
OXYGEN SATURATION: 100 % | SYSTOLIC BLOOD PRESSURE: 148 MMHG | DIASTOLIC BLOOD PRESSURE: 104 MMHG | TEMPERATURE: 98 F | RESPIRATION RATE: 18 BRPM | HEART RATE: 82 BPM

## 2023-06-02 DIAGNOSIS — R06.00 DYSPNEA, UNSPECIFIED: ICD-10-CM

## 2023-06-02 DIAGNOSIS — E78.5 HYPERLIPIDEMIA, UNSPECIFIED: ICD-10-CM

## 2023-06-02 DIAGNOSIS — R06.02 SHORTNESS OF BREATH: ICD-10-CM

## 2023-06-02 DIAGNOSIS — Z98.890 OTHER SPECIFIED POSTPROCEDURAL STATES: Chronic | ICD-10-CM

## 2023-06-02 DIAGNOSIS — I10 ESSENTIAL (PRIMARY) HYPERTENSION: ICD-10-CM

## 2023-06-02 DIAGNOSIS — E11.9 TYPE 2 DIABETES MELLITUS WITHOUT COMPLICATIONS: ICD-10-CM

## 2023-06-02 LAB
ALBUMIN SERPL ELPH-MCNC: 4.3 G/DL — SIGNIFICANT CHANGE UP (ref 3.3–5)
ALP SERPL-CCNC: 83 U/L — SIGNIFICANT CHANGE UP (ref 40–120)
ALT FLD-CCNC: 19 U/L — SIGNIFICANT CHANGE UP (ref 4–41)
ANION GAP SERPL CALC-SCNC: 13 MMOL/L — SIGNIFICANT CHANGE UP (ref 7–14)
AST SERPL-CCNC: 18 U/L — SIGNIFICANT CHANGE UP (ref 4–40)
BASOPHILS # BLD AUTO: 0.05 K/UL — SIGNIFICANT CHANGE UP (ref 0–0.2)
BASOPHILS NFR BLD AUTO: 0.7 % — SIGNIFICANT CHANGE UP (ref 0–2)
BILIRUB SERPL-MCNC: 0.9 MG/DL — SIGNIFICANT CHANGE UP (ref 0.2–1.2)
BLOOD GAS VENOUS COMPREHENSIVE RESULT: SIGNIFICANT CHANGE UP
BUN SERPL-MCNC: 24 MG/DL — HIGH (ref 7–23)
CALCIUM SERPL-MCNC: 9.5 MG/DL — SIGNIFICANT CHANGE UP (ref 8.4–10.5)
CHLORIDE SERPL-SCNC: 103 MMOL/L — SIGNIFICANT CHANGE UP (ref 98–107)
CO2 SERPL-SCNC: 19 MMOL/L — LOW (ref 22–31)
CREAT SERPL-MCNC: 1.43 MG/DL — HIGH (ref 0.5–1.3)
EGFR: 55 ML/MIN/1.73M2 — LOW
EOSINOPHIL # BLD AUTO: 0.22 K/UL — SIGNIFICANT CHANGE UP (ref 0–0.5)
EOSINOPHIL NFR BLD AUTO: 2.9 % — SIGNIFICANT CHANGE UP (ref 0–6)
GLUCOSE BLDC GLUCOMTR-MCNC: 297 MG/DL — HIGH (ref 70–99)
GLUCOSE SERPL-MCNC: 273 MG/DL — HIGH (ref 70–99)
HCT VFR BLD CALC: 37.9 % — LOW (ref 39–50)
HGB BLD-MCNC: 12.9 G/DL — LOW (ref 13–17)
IANC: 4.81 K/UL — SIGNIFICANT CHANGE UP (ref 1.8–7.4)
IMM GRANULOCYTES NFR BLD AUTO: 0.3 % — SIGNIFICANT CHANGE UP (ref 0–0.9)
LYMPHOCYTES # BLD AUTO: 1.87 K/UL — SIGNIFICANT CHANGE UP (ref 1–3.3)
LYMPHOCYTES # BLD AUTO: 24.7 % — SIGNIFICANT CHANGE UP (ref 13–44)
MCHC RBC-ENTMCNC: 29.1 PG — SIGNIFICANT CHANGE UP (ref 27–34)
MCHC RBC-ENTMCNC: 34 GM/DL — SIGNIFICANT CHANGE UP (ref 32–36)
MCV RBC AUTO: 85.4 FL — SIGNIFICANT CHANGE UP (ref 80–100)
MONOCYTES # BLD AUTO: 0.6 K/UL — SIGNIFICANT CHANGE UP (ref 0–0.9)
MONOCYTES NFR BLD AUTO: 7.9 % — SIGNIFICANT CHANGE UP (ref 2–14)
NEUTROPHILS # BLD AUTO: 4.81 K/UL — SIGNIFICANT CHANGE UP (ref 1.8–7.4)
NEUTROPHILS NFR BLD AUTO: 63.5 % — SIGNIFICANT CHANGE UP (ref 43–77)
NRBC # BLD: 0 /100 WBCS — SIGNIFICANT CHANGE UP (ref 0–0)
NRBC # FLD: 0 K/UL — SIGNIFICANT CHANGE UP (ref 0–0)
NT-PROBNP SERPL-SCNC: 283 PG/ML — SIGNIFICANT CHANGE UP
PLATELET # BLD AUTO: 271 K/UL — SIGNIFICANT CHANGE UP (ref 150–400)
POTASSIUM SERPL-MCNC: 4.5 MMOL/L — SIGNIFICANT CHANGE UP (ref 3.5–5.3)
POTASSIUM SERPL-SCNC: 4.5 MMOL/L — SIGNIFICANT CHANGE UP (ref 3.5–5.3)
PROT SERPL-MCNC: 6.7 G/DL — SIGNIFICANT CHANGE UP (ref 6–8.3)
RBC # BLD: 4.44 M/UL — SIGNIFICANT CHANGE UP (ref 4.2–5.8)
RBC # FLD: 14.1 % — SIGNIFICANT CHANGE UP (ref 10.3–14.5)
SODIUM SERPL-SCNC: 135 MMOL/L — SIGNIFICANT CHANGE UP (ref 135–145)
TROPONIN T, HIGH SENSITIVITY RESULT: 26 NG/L — SIGNIFICANT CHANGE UP
TROPONIN T, HIGH SENSITIVITY RESULT: 27 NG/L — SIGNIFICANT CHANGE UP
WBC # BLD: 7.57 K/UL — SIGNIFICANT CHANGE UP (ref 3.8–10.5)
WBC # FLD AUTO: 7.57 K/UL — SIGNIFICANT CHANGE UP (ref 3.8–10.5)

## 2023-06-02 PROCEDURE — 99223 1ST HOSP IP/OBS HIGH 75: CPT

## 2023-06-02 PROCEDURE — 99285 EMERGENCY DEPT VISIT HI MDM: CPT

## 2023-06-02 PROCEDURE — 71046 X-RAY EXAM CHEST 2 VIEWS: CPT | Mod: 26

## 2023-06-02 PROCEDURE — 99222 1ST HOSP IP/OBS MODERATE 55: CPT | Mod: GC

## 2023-06-02 RX ORDER — DEXTROSE 50 % IN WATER 50 %
25 SYRINGE (ML) INTRAVENOUS ONCE
Refills: 0 | Status: DISCONTINUED | OUTPATIENT
Start: 2023-06-02 | End: 2023-06-05

## 2023-06-02 RX ORDER — ATORVASTATIN CALCIUM 80 MG/1
40 TABLET, FILM COATED ORAL AT BEDTIME
Refills: 0 | Status: DISCONTINUED | OUTPATIENT
Start: 2023-06-02 | End: 2023-06-05

## 2023-06-02 RX ORDER — SODIUM CHLORIDE 9 MG/ML
500 INJECTION INTRAMUSCULAR; INTRAVENOUS; SUBCUTANEOUS ONCE
Refills: 0 | Status: COMPLETED | OUTPATIENT
Start: 2023-06-02 | End: 2023-06-02

## 2023-06-02 RX ORDER — VALSARTAN 80 MG/1
160 TABLET ORAL DAILY
Refills: 0 | Status: DISCONTINUED | OUTPATIENT
Start: 2023-06-02 | End: 2023-06-05

## 2023-06-02 RX ORDER — SODIUM CHLORIDE 9 MG/ML
1000 INJECTION, SOLUTION INTRAVENOUS
Refills: 0 | Status: DISCONTINUED | OUTPATIENT
Start: 2023-06-02 | End: 2023-06-05

## 2023-06-02 RX ORDER — DEXTROSE 50 % IN WATER 50 %
15 SYRINGE (ML) INTRAVENOUS ONCE
Refills: 0 | Status: DISCONTINUED | OUTPATIENT
Start: 2023-06-02 | End: 2023-06-05

## 2023-06-02 RX ORDER — ATORVASTATIN CALCIUM 80 MG/1
1 TABLET, FILM COATED ORAL
Qty: 0 | Refills: 0 | DISCHARGE

## 2023-06-02 RX ORDER — INSULIN LISPRO 100/ML
VIAL (ML) SUBCUTANEOUS AT BEDTIME
Refills: 0 | Status: DISCONTINUED | OUTPATIENT
Start: 2023-06-02 | End: 2023-06-05

## 2023-06-02 RX ORDER — METOPROLOL TARTRATE 50 MG
25 TABLET ORAL DAILY
Refills: 0 | Status: DISCONTINUED | OUTPATIENT
Start: 2023-06-02 | End: 2023-06-03

## 2023-06-02 RX ORDER — ONDANSETRON 8 MG/1
4 TABLET, FILM COATED ORAL EVERY 8 HOURS
Refills: 0 | Status: DISCONTINUED | OUTPATIENT
Start: 2023-06-02 | End: 2023-06-05

## 2023-06-02 RX ORDER — PANTOPRAZOLE SODIUM 20 MG/1
1 TABLET, DELAYED RELEASE ORAL
Qty: 0 | Refills: 0 | DISCHARGE

## 2023-06-02 RX ORDER — DEXTROSE 50 % IN WATER 50 %
12.5 SYRINGE (ML) INTRAVENOUS ONCE
Refills: 0 | Status: DISCONTINUED | OUTPATIENT
Start: 2023-06-02 | End: 2023-06-05

## 2023-06-02 RX ORDER — PANTOPRAZOLE SODIUM 20 MG/1
40 TABLET, DELAYED RELEASE ORAL
Refills: 0 | Status: DISCONTINUED | OUTPATIENT
Start: 2023-06-02 | End: 2023-06-05

## 2023-06-02 RX ORDER — LANOLIN ALCOHOL/MO/W.PET/CERES
3 CREAM (GRAM) TOPICAL AT BEDTIME
Refills: 0 | Status: DISCONTINUED | OUTPATIENT
Start: 2023-06-02 | End: 2023-06-05

## 2023-06-02 RX ORDER — ACETAMINOPHEN 500 MG
650 TABLET ORAL EVERY 6 HOURS
Refills: 0 | Status: DISCONTINUED | OUTPATIENT
Start: 2023-06-02 | End: 2023-06-05

## 2023-06-02 RX ORDER — GLUCAGON INJECTION, SOLUTION 0.5 MG/.1ML
1 INJECTION, SOLUTION SUBCUTANEOUS ONCE
Refills: 0 | Status: DISCONTINUED | OUTPATIENT
Start: 2023-06-02 | End: 2023-06-05

## 2023-06-02 RX ORDER — INSULIN LISPRO 100/ML
VIAL (ML) SUBCUTANEOUS
Refills: 0 | Status: DISCONTINUED | OUTPATIENT
Start: 2023-06-02 | End: 2023-06-05

## 2023-06-02 RX ORDER — APIXABAN 2.5 MG/1
5 TABLET, FILM COATED ORAL EVERY 12 HOURS
Refills: 0 | Status: DISCONTINUED | OUTPATIENT
Start: 2023-06-02 | End: 2023-06-05

## 2023-06-02 RX ADMIN — SODIUM CHLORIDE 250 MILLILITER(S): 9 INJECTION INTRAMUSCULAR; INTRAVENOUS; SUBCUTANEOUS at 17:05

## 2023-06-02 NOTE — ED ADULT NURSE NOTE - NSFALLUNIVINTERV_ED_ALL_ED
Bed/Stretcher in lowest position, wheels locked, appropriate side rails in place/Call bell, personal items and telephone in reach/Instruct patient to call for assistance before getting out of bed/chair/stretcher/Non-slip footwear applied when patient is off stretcher/[place] to call system/Physically safe environment - no spills, clutter or unnecessary equipment/Purposeful proactive rounding/Room/bathroom lighting operational, light cord in reach

## 2023-06-02 NOTE — CONSULT NOTE ADULT - SUBJECTIVE AND OBJECTIVE BOX
CARDIOLOGY FELLOW CONSULT NOTE    HPI:  63 y/o M with PMH of CVA( 10 years ago without any residual), HTN, HLD, DM type II, Chronic everyday smoker, NICM(with EF of 32%), taking Eliquis for a/c started by his cardiologist, patient is unsure why he is taking Eliquis presented to [facility] for LHC for abnormal stress test in March earlier this year. We do not have results of stress test, but he underwent LHC w/ Dr. Cardona which showed moderate nonobstructive CAD.   Has not followed up since.    In the ED: AF, HR 82, /104 satting 100% Ra  135/4.3 | 103/19 | 24/1.43 (baseline) < 273  18/19 | 83 | 0.9 | 6.7/4.3  7.57 < 12.9 MCV 85.4 > 271  hstrop 27 proBNP 283  Lactate 1.4  XR: Clear    PMHx:   High blood pressure  DM (diabetes mellitus)  Smoker  Cerebrovascular accident (CVA)  Eczema    PSHx:   S/P repair of pectoralis muscle tear    Allergies:  No Known Allergies    Home Meds:  * Patient Currently Takes Medications as of 20-Mar-2023 10:22 documented in Structured Notes  · 	metFORMIN 500 mg oral tablet: Last Dose Taken:  , 1 tab(s) orally 2 times a day.   · 	atorvastatin 10 mg oral tablet: Last Dose Taken:  , 1 tab(s) orally once a day (at bedtime)  · 	metoprolol succinate 25 mg oral tablet, extended release: Last Dose Taken:  , 1 tab(s) orally once a day (at bedtime)  · 	pantoprazole 40 mg oral delayed release tablet: Last Dose Taken:  , 1 tab(s) orally once a day  · 	valsartan 160 mg oral tablet: Last Dose Taken:  , 1 tab(s) orally once a day  · 	Eliquis 5 mg oral tablet: Last Dose Taken: 17-Mar-2023 , 1 tab(s) orally 2 times a day    FAMILY HISTORY:    Social History:  Smoking History:  Alcohol Use:  Drug Use:    REVIEW OF SYSTEMS: 14pt ros neg unless stated above    Physical Exam:  T(F): 98.4 (06-02), Max: 98.4 (06-02)  HR: 82 (06-02) (82 - 82)  BP: 148/104 (06-02) (148/104 - 148/104)  RR: 18 (06-02)  SpO2: 100% (06-02)  GENERAL: No acute distress, well-developed  HEAD:  Atraumatic, Normocephalic  ENT: EOMI, PERRLA, conjunctiva and sclera clear, Neck supple, No JVD, moist mucosa  CHEST/LUNG: Clear to auscultation bilaterally; No wheeze, equal breath sounds bilaterally   BACK: No spinal tenderness  HEART: Regular rate and rhythm; No murmurs, rubs, or gallops  ABDOMEN: Soft, Nontender, Nondistended; Bowel sounds present  EXTREMITIES:  No clubbing, cyanosis, or edema  PSYCH: Nl behavior, nl affect  NEUROLOGY: AAOx3, non-focal, cranial nerves intact  SKIN: Normal color, No rashes or lesions  LINES:    ECG 3/2023: wandering baseline, with Q waves inferiorlyq waves in V5-V6.  , sinus rhythm, LAD early R wave progression, nonspecific ST changes (TWI, biphasic) in precordial leads    ECG 6/2: similar, nonspecific st changes including TWI in the lateral leads, asymmetric     Cath 3/20  Moderate atherosclerosis in LAD and LCx. RCA is ectatic with mild  atherosclerosis.    Left main artery: Angiography shows minor irregularities. There is a 20 % stenosis.    Proximal left anterior descending: There is a 50 % stenosis. Mid left anterior descending: There is a 60 % stenosis. First diagonal: The segment is small. There is a 60 % stenosis in the ostium portion of the segment.  Proximal circumflex: There is a 50 % stenosis. First obtuse marginal: diffuse atherosclerosis. . There is a 40 % stenosis.  Right coronary artery: The segment is moderately ectatic. Angiography shows mild atherosclerosis.    Recommendations:   Optimize cardiac medications.    Counseled for regular cardiovascular exercise and diet, and against  very heavy weight lifting.  Goal LDL < 70.       CXR: Personally reviewed    Labs: Personally reviewed                        12.9   7.57  )-----------( 271      ( 02 Jun 2023 15:20 )             37.9     06-02    135  |  103  |  24<H>  ----------------------------<  273<H>  4.5   |  19<L>  |  1.43<H>    Ca    9.5      02 Jun 2023 15:20    TPro  6.7  /  Alb  4.3  /  TBili  0.9  /  DBili  x   /  AST  18  /  ALT  19  /  AlkPhos  83  06-02        CARDIAC MARKERS ( 02 Jun 2023 15:20 )  27 ng/L / x     / x     / x     / x     / x       CARDIOLOGY FELLOW CONSULT NOTE    HPI:  63 y/o M with PMH of CVA( 10 years ago without any residual), HTN, HLD, DM type II, Chronic everyday smoker, NICM(with EF of 32%), taking Eliquis for a/c started by his cardiologist, patient is unsure why he is taking Eliquis presented to [facility] for LHC for abnormal stress test in March earlier this year. We do not have results of stress test, but he underwent LHC w/ Dr. Bernabe which showed moderate nonobstructive CAD.   Has not followed up since w/ Dr. Bernabe as was originally scheduled.   Patient reports prior to LHC, was in gym x 3h a day, but since LHC, feels on and off dyspnea that is atypical and sometimes at rest, not necessarily more with exercise. Denies any JUAN JOSÉ, PND.     In the ED: AF, HR 82, /104 satting 100% Ra  135/4.3 | 103/19 | 24/1.43 (baseline) < 273  18/19 | 83 | 0.9 | 6.7/4.3  7.57 < 12.9 MCV 85.4 > 271  hstrop 27 proBNP 283  Lactate 1.4  XR: Clear    PMHx:   High blood pressure  DM (diabetes mellitus)  Smoker  Cerebrovascular accident (CVA)  Eczema    PSHx:   S/P repair of pectoralis muscle tear    Allergies:  No Known Allergies    Home Meds:  * Patient Currently Takes Medications as of 20-Mar-2023 10:22 documented in Structured Notes  · 	metFORMIN 500 mg oral tablet: Last Dose Taken:  , 1 tab(s) orally 2 times a day.   · 	atorvastatin 10 mg oral tablet: Last Dose Taken:  , 1 tab(s) orally once a day (at bedtime)  · 	metoprolol succinate 25 mg oral tablet, extended release: Last Dose Taken:  , 1 tab(s) orally once a day (at bedtime)  · 	pantoprazole 40 mg oral delayed release tablet: Last Dose Taken:  , 1 tab(s) orally once a day  · 	valsartan 160 mg oral tablet: Last Dose Taken:  , 1 tab(s) orally once a day  · 	Eliquis 5 mg oral tablet: Last Dose Taken: 17-Mar-2023 , 1 tab(s) orally 2 times a day    FAMILY HISTORY: Mom with diabetes    Social History: prior [employer]. Then worked at [employer]  prior smoker (quit after MI), was a pack every 3 days since teenager prior to.  Remote cocaine use  NO alcohol    REVIEW OF SYSTEMS: 14pt ros neg unless stated above    Physical Exam:  T(F): 98.4 (06-02), Max: 98.4 (06-02)  HR: 82 (06-02) (82 - 82)  BP: 148/104 (06-02) (148/104 - 148/104)  RR: 18 (06-02)  SpO2: 100% (06-02)  GENERAL: No acute distress, well-developed  HEAD:  Atraumatic, Normocephalic  ENT: EOMI, PERRLA, conjunctiva and sclera clear, Neck supple, No JVD, moist mucosa  CHEST/LUNG: Clear to auscultation bilaterally; No wheeze, equal breath sounds bilaterally   BACK: No spinal tenderness  HEART: Regular rate and rhythm; No murmurs, rubs, or gallops  ABDOMEN: Soft, Nontender, Nondistended; Bowel sounds present  EXTREMITIES:  No clubbing, cyanosis, or edema  PSYCH: Nl behavior, nl affect  NEUROLOGY: AAOx3, non-focal, cranial nerves intact  SKIN: Normal color, No rashes or lesions    ECG 3/2023: wandering baseline, with Q waves inferiorlyq waves in V5-V6.  , sinus rhythm, LAD early R wave progression, nonspecific ST changes (TWI, biphasic) in precordial leads    ECG 6/2: similar, nonspecific st changes including TWI in the lateral leads, asymmetric     Cath 3/20  Moderate atherosclerosis in LAD and LCx. RCA is ectatic with mild  atherosclerosis.    Left main artery: Angiography shows minor irregularities. There is a 20 % stenosis.    Proximal left anterior descending: There is a 50 % stenosis. Mid left anterior descending: There is a 60 % stenosis. First diagonal: The segment is small. There is a 60 % stenosis in the ostium portion of the segment.  Proximal circumflex: There is a 50 % stenosis. First obtuse marginal: diffuse atherosclerosis. . There is a 40 % stenosis.  Right coronary artery: The segment is moderately ectatic. Angiography shows mild atherosclerosis.    Recommendations:   Optimize cardiac medications.    Counseled for regular cardiovascular exercise and diet, and against  very heavy weight lifting.  Goal LDL < 70.       CXR: Personally reviewed    Labs: Personally reviewed                        12.9   7.57  )-----------( 271      ( 02 Jun 2023 15:20 )             37.9     06-02    135  |  103  |  24<H>  ----------------------------<  273<H>  4.5   |  19<L>  |  1.43<H>    Ca    9.5      02 Jun 2023 15:20    TPro  6.7  /  Alb  4.3  /  TBili  0.9  /  DBili  x   /  AST  18  /  ALT  19  /  AlkPhos  83  06-02    CARDIAC MARKERS ( 02 Jun 2023 15:20 )  27 ng/L / x     / x     / x     / x     / x

## 2023-06-02 NOTE — PROVIDER CONTACT NOTE (OTHER) - SITUATION
Pt's night time blood sugar is 297, Pt refusing 1 unit of insulin, stating " I only take metformin, and I don't want insulin". Pt educated on the need for insulin.

## 2023-06-02 NOTE — H&P ADULT - NSHPSOCIALHISTORY_GEN_ALL_CORE
Does not use tobacco products at present , consume alcohol regularly or partake in illicit drug use at present  Pt reports he stopped smoking tobacco/marijuana since his last hospitalization 3/2023

## 2023-06-02 NOTE — ED PROVIDER NOTE - OBJECTIVE STATEMENT
63-year-old male with past medical history of CVA (10 years ago without any residual symptoms), hypertension, hyperlipidemia, diabetes, previous everyday smoker, nonischemic cardiomyopathy (ejection fraction 32%), on Eliquis as per cardiologist, recently admitted to the hospitalist and March 28, 2023 due to ischemic heart failure presents to the emergency department scratch with dyspnea on exertion, worsening in severity since January.  In March patient was admitted got cardiac cath which indicated proximal LAD stenosis 50%, mid left LAD 60% stenosis, 20% stenosis left main.  Patient denies any fevers, chills, nausea, vomiting, chest pain, abdominal pain.

## 2023-06-02 NOTE — PHARMACOTHERAPY INTERVENTION NOTE - COMMENTS
Medication history is incomplete. Unable to verify patient's medication list with two sources. Please call spectra [phone] if you have any questions.   Source [facility]

## 2023-06-02 NOTE — ED PROVIDER NOTE - CONSIDERATION OF ADMISSION OBSERVATION
Consideration of Admission/Observation pt with progressive worsening SOB that is affecting his ability to perform his ADLs, with recent negative cath but still worsening symptoms, will require admission

## 2023-06-02 NOTE — ED PROVIDER NOTE - CLINICAL SUMMARY MEDICAL DECISION MAKING FREE TEXT BOX
63-year-old male with past medical history of CVA (10 years ago without any residual symptoms), hypertension, hyperlipidemia, diabetes, previous everyday smoker, nonischemic cardiomyopathy (ejection fraction 32%), on Eliquis as per cardiologist, recently admitted to the hospitalist and March 28, 2023 due to ischemic heart failure presents to the emergency department scratch with dyspnea on exertion, worsening in severity since January. Concern for worsening CHF vs metabolic vs electrolyte abn vs pna vs ACS. will get labs, CXR, and re-eval. will most likely need admission

## 2023-06-02 NOTE — H&P ADULT - HISTORY OF PRESENT ILLNESS
62 yr old male with a pmh of CVA with no residual deficits, HTN, HLD, T2DM, NICM (EF 32%), CAD, ex smoker (stopped 3/2023), on Eliquis per his cardiologist, who presents with intermittent dyspnea since 2/2023 that is progressing ever since his LHC in 3/2023. Occurs both at rest and on exertion.  States he sometimes has a funny feeling in his stomach that he denies as a burning sensation and is not related to food- though does eat a lot of spicy foods as well.   Denies  headache, dizziness, chest pain, palpitations, PND, abdominal pain, joint pain, diarrhea/constipation, urinary symptoms.   Vitals: T 99, HR 85, /92, RR 22 satting 100% RA

## 2023-06-02 NOTE — CONSULT NOTE ADULT - ATTENDING COMMENTS
Jeff Rizzo is a 62 year old man with history of CVA (10 yrs ago without any residual symptoms), HTN, HLD, DM II, Chronic daily smoker, and NICM (LVEF of 32%), on apixaban (Eliquis) for reason not currently known. He p/w CALDWELL. Initial cardiac enzymes are unremarkable. LHC 3/20/23 showed 20% LMain, 50% pLAD, 60% mLAD, 60% oD1, 50% pLCX, and 40% OM1. Current serum proBNP 231 pg/mL. Initial management to include serial ECG and HSTropT. Obtain TTE for re-assessment of LV size and systolic function. Up titrate atorvastatin to 80 mg daily at bedtime, maintain aspirin 81 mg daily. Maintain metoprolol succinate ER 25 mg daily. Maintain valsartan 160 mg daily. Maintain metformin 500 mg 2X daily

## 2023-06-02 NOTE — ED ADULT NURSE NOTE - OBJECTIVE STATEMENT
Pt received in room 10. Pt is a&ox3, respirations even and unlabored, no acute distress in appearance. Pt reports that he has been having shortness of breath, exacerbated on exertion, since he had a catheterization in March. Pt PCP send him here for further evaluation. Pt well appearing, skin appropriate for race. Continuous cardiac monitoring shows normal sinus rhythm, spo2 is 98% on room air. Pt able to speak in complete sentences without difficulty. Ambulatory at baseline without difficulty. Will maintain safety and continue to monitor.

## 2023-06-02 NOTE — CONSULT NOTE ADULT - NS ATTEST RISK PROBLEM GEN_ALL_CORE FT
62 year old man with history of CVA (10 yrs ago without any residual symptoms), HTN, HLD, DM II, Chronic daily smoker, and NICM (LVEF of 32%), on apixaban (Eliquis) for reason not currently known. He p/w CALDWELL. [facility] 3/20/23 showed 20% LMain, 50% pLAD, 60% mLAD, 60% oD1, 50% pLCX, and 40% OM1.

## 2023-06-02 NOTE — H&P ADULT - NSICDXPASTMEDICALHX_GEN_ALL_CORE_FT
PAST MEDICAL HISTORY:  Benign essential HTN     Cerebrovascular accident (CVA) 2009 - no residuals    DM (diabetes mellitus) type 2    Eczema left arm    NICM (nonischemic cardiomyopathy)     Smoker for 45 years, down to 1/2 a pack daily

## 2023-06-02 NOTE — CONSULT NOTE ADULT - TIME BILLING
62 year old man with history of CVA (10 yrs ago without any residual symptoms), HTN, HLD, DM II, Chronic daily smoker, and NICM (LVEF of 32%), on apixaban (Eliquis) for reason not currently known. He p/w CALDWELL. [facility] 3/20/23 showed 20% LMain, 50% pLAD, 60% mLAD, 60% oD1, 50% pLCX, and 40% OM1.

## 2023-06-02 NOTE — ED ADULT NURSE NOTE - CHIEF COMPLAINT QUOTE
states" I am having trouble breathing since March and my doctor send me here to get checked for my heart ". states he has blockage in his heart, HTN, DM CVA on Eliquis  denies any stents. Fs 332

## 2023-06-02 NOTE — H&P ADULT - NSHPREVIEWOFSYSTEMS_GEN_ALL_CORE
REVIEW OF SYSTEMS:    CONSTITUTIONAL: No weakness, fevers or chills  EYES/ENT: No visual changes;  No dysphagia; No sore throat; No rhinorrhea; No sinus pain/pressure  NECK: No pain or stiffness  RESPIRATORY: No cough, wheezing, hemoptysis; + shortness of breath  CARDIOVASCULAR: No chest pain or palpitations; No lower extremity edema  GASTROINTESTINAL: No abdominal or epigastric pain. No nausea, vomiting, or hematemesis; No diarrhea or constipation. No melena or hematochezia.  GENITOURINARY: No dysuria, frequency or hematuria  NEUROLOGICAL: No numbness or weakness  MSK: ambulates without assistance   SKIN: No itching, burning, rashes, or lesions   All other review of systems is negative unless indicated above.

## 2023-06-02 NOTE — ED ADULT TRIAGE NOTE - CHIEF COMPLAINT QUOTE
states" I am having trouble breathing since March and my doctor send me here to get checked for my heart ". states he has blockage in his heart, HTN, DM  denies any stents. Fs 332 states" I am having trouble breathing since March and my doctor send me here to get checked for my heart ". states he has blockage in his heart, HTN, DM CVA on Eliquis  denies any stents. Fs 332

## 2023-06-02 NOTE — ED PROVIDER NOTE - EKG ADDITIONAL INFORMATION FREE TEXT
The EKG shows no ST segment elevations or depressions.  There are no hyperacute T waves and no malignant dysrhythmia.

## 2023-06-02 NOTE — H&P ADULT - NSHPLABSRESULTS_GEN_ALL_CORE
labs personally reviewed and pertinent [facility] documents/labs/diagnostics reviewed                         12.9   7.57  )-----------( 271      ( 02 Jun 2023 15:20 )             37.9       06-02    135  |  103  |  24<H>  ----------------------------<  273<H>  4.5   |  19<L>  |  1.43<H>    Ca    9.5      02 Jun 2023 15:20    TPro  6.7  /  Alb  4.3  /  TBili  0.9  /  DBili  x   /  AST  18  /  ALT  19  /  AlkPhos  83  06-02                15:20 - VBG - pH: 7.36  | pCO2: 38    | pO2: 62    | Lactate: 1.4        ECG interpreted by Cardiology: similar, nonspecific st changes including TWI in the lateral leads, asymmetric    interpreted by myself: clear lungs

## 2023-06-02 NOTE — H&P ADULT - PROBLEM SELECTOR PLAN 1
Acute on chronic  CXR: clear lungs, proBNP 283, saturating well on RA, trop 27->29  Hx of smoking   Hx of abdomen feeling unsettled-> start protonix 40mg daily (states doesn't take at home)  Cardiology consulted: aspirin, atorvastatin, metoprolol, valsartan, ECHO, no indication for Eliquis-> obtain collateral to see indication ( eliquis continued in the meantime), tele

## 2023-06-02 NOTE — ED PROVIDER NOTE - ATTENDING CONTRIBUTION TO CARE
Dr. Underwood: 62 yo male with CVA in the past (no residual deficits), HTN, HLD, DM, nonischemic cardiomyopathy with EF 32% and nonischemic cardiac cath 2.5 months ago, in ED with worsening CALDWELL and fatigue that is making him less able to tolerate his other ADLs.  No fever, CP, N/V/D, abdominal pain.  On exam pt well appearing, in NAD, heart RRR, lungs CTAB, abd NTND, extremities without swelling, strength 5/5 in all extremities and skin without rash.

## 2023-06-03 ENCOUNTER — TRANSCRIPTION ENCOUNTER (OUTPATIENT)
Age: 63
End: 2023-06-03

## 2023-06-03 DIAGNOSIS — I10 ESSENTIAL (PRIMARY) HYPERTENSION: ICD-10-CM

## 2023-06-03 DIAGNOSIS — I48.91 UNSPECIFIED ATRIAL FIBRILLATION: ICD-10-CM

## 2023-06-03 DIAGNOSIS — R06.02 SHORTNESS OF BREATH: ICD-10-CM

## 2023-06-03 DIAGNOSIS — I48.0 PAROXYSMAL ATRIAL FIBRILLATION: ICD-10-CM

## 2023-06-03 LAB
A1C WITH ESTIMATED AVERAGE GLUCOSE RESULT: 8.9 % — HIGH (ref 4–5.6)
ANION GAP SERPL CALC-SCNC: 15 MMOL/L — HIGH (ref 7–14)
BASOPHILS # BLD AUTO: 0.04 K/UL — SIGNIFICANT CHANGE UP (ref 0–0.2)
BASOPHILS NFR BLD AUTO: 0.5 % — SIGNIFICANT CHANGE UP (ref 0–2)
BUN SERPL-MCNC: 20 MG/DL — SIGNIFICANT CHANGE UP (ref 7–23)
CALCIUM SERPL-MCNC: 9.6 MG/DL — SIGNIFICANT CHANGE UP (ref 8.4–10.5)
CHLORIDE SERPL-SCNC: 105 MMOL/L — SIGNIFICANT CHANGE UP (ref 98–107)
CHOLEST SERPL-MCNC: 146 MG/DL — SIGNIFICANT CHANGE UP
CO2 SERPL-SCNC: 18 MMOL/L — LOW (ref 22–31)
CREAT SERPL-MCNC: 1.28 MG/DL — SIGNIFICANT CHANGE UP (ref 0.5–1.3)
EGFR: 62 ML/MIN/1.73M2 — SIGNIFICANT CHANGE UP
EOSINOPHIL # BLD AUTO: 0.28 K/UL — SIGNIFICANT CHANGE UP (ref 0–0.5)
EOSINOPHIL NFR BLD AUTO: 3.6 % — SIGNIFICANT CHANGE UP (ref 0–6)
ESTIMATED AVERAGE GLUCOSE: 209 — SIGNIFICANT CHANGE UP
GLUCOSE BLDC GLUCOMTR-MCNC: 211 MG/DL — HIGH (ref 70–99)
GLUCOSE BLDC GLUCOMTR-MCNC: 223 MG/DL — HIGH (ref 70–99)
GLUCOSE BLDC GLUCOMTR-MCNC: 224 MG/DL — HIGH (ref 70–99)
GLUCOSE BLDC GLUCOMTR-MCNC: 292 MG/DL — HIGH (ref 70–99)
GLUCOSE SERPL-MCNC: 193 MG/DL — HIGH (ref 70–99)
HCT VFR BLD CALC: 40.1 % — SIGNIFICANT CHANGE UP (ref 39–50)
HCV AB S/CO SERPL IA: 0.07 S/CO — SIGNIFICANT CHANGE UP (ref 0–0.99)
HCV AB SERPL-IMP: SIGNIFICANT CHANGE UP
HDLC SERPL-MCNC: 49 MG/DL — SIGNIFICANT CHANGE UP
HGB BLD-MCNC: 13.4 G/DL — SIGNIFICANT CHANGE UP (ref 13–17)
IANC: 4.45 K/UL — SIGNIFICANT CHANGE UP (ref 1.8–7.4)
IMM GRANULOCYTES NFR BLD AUTO: 0.3 % — SIGNIFICANT CHANGE UP (ref 0–0.9)
LIPID PNL WITH DIRECT LDL SERPL: 79 MG/DL — SIGNIFICANT CHANGE UP
LYMPHOCYTES # BLD AUTO: 2.47 K/UL — SIGNIFICANT CHANGE UP (ref 1–3.3)
LYMPHOCYTES # BLD AUTO: 31.7 % — SIGNIFICANT CHANGE UP (ref 13–44)
MCHC RBC-ENTMCNC: 28.6 PG — SIGNIFICANT CHANGE UP (ref 27–34)
MCHC RBC-ENTMCNC: 33.4 GM/DL — SIGNIFICANT CHANGE UP (ref 32–36)
MCV RBC AUTO: 85.5 FL — SIGNIFICANT CHANGE UP (ref 80–100)
MONOCYTES # BLD AUTO: 0.54 K/UL — SIGNIFICANT CHANGE UP (ref 0–0.9)
MONOCYTES NFR BLD AUTO: 6.9 % — SIGNIFICANT CHANGE UP (ref 2–14)
NEUTROPHILS # BLD AUTO: 4.45 K/UL — SIGNIFICANT CHANGE UP (ref 1.8–7.4)
NEUTROPHILS NFR BLD AUTO: 57 % — SIGNIFICANT CHANGE UP (ref 43–77)
NON HDL CHOLESTEROL: 97 MG/DL — SIGNIFICANT CHANGE UP
NRBC # BLD: 0 /100 WBCS — SIGNIFICANT CHANGE UP (ref 0–0)
NRBC # FLD: 0 K/UL — SIGNIFICANT CHANGE UP (ref 0–0)
PLATELET # BLD AUTO: 273 K/UL — SIGNIFICANT CHANGE UP (ref 150–400)
POTASSIUM SERPL-MCNC: 4.2 MMOL/L — SIGNIFICANT CHANGE UP (ref 3.5–5.3)
POTASSIUM SERPL-SCNC: 4.2 MMOL/L — SIGNIFICANT CHANGE UP (ref 3.5–5.3)
RBC # BLD: 4.69 M/UL — SIGNIFICANT CHANGE UP (ref 4.2–5.8)
RBC # FLD: 14.2 % — SIGNIFICANT CHANGE UP (ref 10.3–14.5)
SODIUM SERPL-SCNC: 138 MMOL/L — SIGNIFICANT CHANGE UP (ref 135–145)
TRIGL SERPL-MCNC: 92 MG/DL — SIGNIFICANT CHANGE UP
WBC # BLD: 7.8 K/UL — SIGNIFICANT CHANGE UP (ref 3.8–10.5)
WBC # FLD AUTO: 7.8 K/UL — SIGNIFICANT CHANGE UP (ref 3.8–10.5)

## 2023-06-03 PROCEDURE — 99232 SBSQ HOSP IP/OBS MODERATE 35: CPT

## 2023-06-03 PROCEDURE — 99232 SBSQ HOSP IP/OBS MODERATE 35: CPT | Mod: GC

## 2023-06-03 RX ORDER — INSULIN LISPRO 100/ML
4 VIAL (ML) SUBCUTANEOUS
Refills: 0 | Status: DISCONTINUED | OUTPATIENT
Start: 2023-06-03 | End: 2023-06-05

## 2023-06-03 RX ORDER — METOPROLOL TARTRATE 50 MG
25 TABLET ORAL ONCE
Refills: 0 | Status: COMPLETED | OUTPATIENT
Start: 2023-06-03 | End: 2023-06-03

## 2023-06-03 RX ORDER — METOPROLOL TARTRATE 50 MG
50 TABLET ORAL DAILY
Refills: 0 | Status: DISCONTINUED | OUTPATIENT
Start: 2023-06-04 | End: 2023-06-05

## 2023-06-03 RX ORDER — INSULIN GLARGINE 100 [IU]/ML
12 INJECTION, SOLUTION SUBCUTANEOUS AT BEDTIME
Refills: 0 | Status: DISCONTINUED | OUTPATIENT
Start: 2023-06-03 | End: 2023-06-05

## 2023-06-03 RX ADMIN — PANTOPRAZOLE SODIUM 40 MILLIGRAM(S): 20 TABLET, DELAYED RELEASE ORAL at 06:12

## 2023-06-03 RX ADMIN — APIXABAN 5 MILLIGRAM(S): 2.5 TABLET, FILM COATED ORAL at 06:18

## 2023-06-03 RX ADMIN — VALSARTAN 160 MILLIGRAM(S): 80 TABLET ORAL at 06:12

## 2023-06-03 RX ADMIN — APIXABAN 5 MILLIGRAM(S): 2.5 TABLET, FILM COATED ORAL at 17:20

## 2023-06-03 RX ADMIN — Medication 25 MILLIGRAM(S): at 15:41

## 2023-06-03 RX ADMIN — INSULIN GLARGINE 12 UNIT(S): 100 INJECTION, SOLUTION SUBCUTANEOUS at 21:36

## 2023-06-03 RX ADMIN — Medication 25 MILLIGRAM(S): at 06:12

## 2023-06-03 RX ADMIN — Medication 2: at 09:20

## 2023-06-03 RX ADMIN — Medication 4 UNIT(S): at 17:21

## 2023-06-03 RX ADMIN — ATORVASTATIN CALCIUM 40 MILLIGRAM(S): 80 TABLET, FILM COATED ORAL at 21:36

## 2023-06-03 RX ADMIN — Medication 3: at 13:15

## 2023-06-03 RX ADMIN — Medication 2: at 17:21

## 2023-06-03 NOTE — DISCHARGE NOTE PROVIDER - NSDCCPCAREPLAN_GEN_ALL_CORE_FT
PRINCIPAL DISCHARGE DIAGNOSIS  Diagnosis: Paroxysmal atrial fibrillation  Assessment and Plan of Treatment:       SECONDARY DISCHARGE DIAGNOSES  Diagnosis: Type 2 diabetes mellitus treated without insulin  Assessment and Plan of Treatment:     Diagnosis: Benign essential HTN  Assessment and Plan of Treatment:      PRINCIPAL DISCHARGE DIAGNOSIS  Diagnosis: Vasovagal near-syncope  Assessment and Plan of Treatment: heart attack or new structural heart disease is ruled out. blood pressure meds uptitrated. f/u cardiologist in clinic to discuss possible long term arrythmia monitoring and if Eliquis is needed      SECONDARY DISCHARGE DIAGNOSES  Diagnosis: Type 2 diabetes mellitus treated without insulin  Assessment and Plan of Treatment: c/w metformin    Diagnosis: Benign essential HTN  Assessment and Plan of Treatment: continue valsartan, metoprolol (dose increased)    Diagnosis: Systolic CHF, chronic  Assessment and Plan of Treatment: stable. continue home meds     PRINCIPAL DISCHARGE DIAGNOSIS  Diagnosis: Vasovagal near-syncope  Assessment and Plan of Treatment: Heart attack or new structural heart disease is ruled out. blood pressure meds uptitrated. f/u cardiologist in clinic to discuss possible long term arrythmia monitoring and if Eliquis is needed      SECONDARY DISCHARGE DIAGNOSES  Diagnosis: Type 2 diabetes mellitus treated without insulin  Assessment and Plan of Treatment: c/w metformin  1800 calorie diabetic diet/ low salt, low fat , low cholesterol    Diagnosis: Benign essential HTN  Assessment and Plan of Treatment: continue valsartan, metoprolol (dose increased),    Diagnosis: Systolic CHF, chronic  Assessment and Plan of Treatment: stable. continue home meds

## 2023-06-03 NOTE — PROGRESS NOTE ADULT - NS ATTEST RISK PROBLEM GEN_ALL_CORE FT
Jeff Rizzo is a 62 year old man with history of CVA (10 yrs ago without any residual symptoms), HTN, HLD, DM II, Chronic daily smoker, and NICM(LVEF of 32%), on apixaban (Eliquis) for reason not currently known. He p/w CALDWELL. Initial cardiac enzymes are unremarkable. LHC 3/20/23 showed 20% LMain, 50% pLAD, 60% mLAD, 60% oD1, 50% pLCX, and 40% OM1. Current serum proBNP 231 pg/mL.

## 2023-06-03 NOTE — PROGRESS NOTE ADULT - SUBJECTIVE AND OBJECTIVE BOX
Aicha Sommer MD   [employer] Teams, Pager [phone]    INTERVAL HPI/OVERNIGHT EVENTS:  Patient was seen and examined patient states this morning he had an episode of lightheadedness while eating breakfast associated w/ dyspnea. Patient denies chest pain. Denies true vertigo. ROS otherwise negative.   Tele reviewed patient going in and out of atrial fibrillation -150 when he is in afib. Notified cardiology and up-titrated metoprolol.   Case discussed w/ Dr. Waterman, cardiology.     VITAL SIGNS:  T(F): 98.1 (06-03-23 @ 13:44)  HR: 87 (06-03-23 @ 13:44)  BP: 130/91 (06-03-23 @ 13:44)  RR: 18 (06-03-23 @ 13:44)  SpO2: 100% (06-03-23 @ 13:44)  Wt(kg): --    PHYSICAL EXAM:  GENERAL: NAD, comfortable at bedside   HEAD:  Atraumatic, Normocephalic  EYES: EOMI, PERRL, conjunctiva and sclera clear  NECK: Supple, No JVD  CHEST/LUNG: Clear to auscultation bilaterally; No wheezes, rales or rhonchi  HEART: Regular rate and rhythm; No murmurs, rubs, or gallops, (+)S1, S2  ABDOMEN: Soft, Nontender, Nondistended; Normal Bowel sounds   EXTREMITIES:  2+ Peripheral Pulses, No clubbing, cyanosis, or edema  PSYCH: normal mood and affect  NEUROLOGY: AAOx3, non-focal  SKIN: No rashes or lesions    MEDICATIONS  (STANDING):  apixaban 5 milliGRAM(s) Oral every 12 hours  atorvastatin 40 milliGRAM(s) Oral at bedtime  dextrose 5%. 1000 milliLiter(s) (100 mL/Hr) IV Continuous <Continuous>  dextrose 5%. 1000 milliLiter(s) (50 mL/Hr) IV Continuous <Continuous>  dextrose 50% Injectable 25 Gram(s) IV Push once  dextrose 50% Injectable 12.5 Gram(s) IV Push once  dextrose 50% Injectable 25 Gram(s) IV Push once  glucagon  Injectable 1 milliGRAM(s) IntraMuscular once  insulin lispro (ADMELOG) corrective regimen sliding scale   SubCutaneous three times a day before meals  insulin lispro (ADMELOG) corrective regimen sliding scale   SubCutaneous at bedtime  metoprolol succinate ER 25 milliGRAM(s) Oral once  pantoprazole    Tablet 40 milliGRAM(s) Oral before breakfast  valsartan 160 milliGRAM(s) Oral daily    MEDICATIONS  (PRN):  acetaminophen     Tablet .. 650 milliGRAM(s) Oral every 6 hours PRN Temp greater or equal to 38C (100.4F), Mild Pain (1 - 3)  aluminum hydroxide/magnesium hydroxide/simethicone Suspension 30 milliLiter(s) Oral every 4 hours PRN Dyspepsia  dextrose Oral Gel 15 Gram(s) Oral once PRN Blood Glucose LESS THAN 70 milliGRAM(s)/deciliter  melatonin 3 milliGRAM(s) Oral at bedtime PRN Insomnia  ondansetron Injectable 4 milliGRAM(s) IV Push every 8 hours PRN Nausea and/or Vomiting      Allergies    No Known Allergies    Intolerances        LABS:                        13.4   7.80  )-----------( 273      ( 03 Jun 2023 05:51 )             40.1     06-03    138  |  105  |  20  ----------------------------<  193<H>  4.2   |  18<L>  |  1.28    Ca    9.6      03 Jun 2023 05:51    TPro  6.7  /  Alb  4.3  /  TBili  0.9  /  DBili  x   /  AST  18  /  ALT  19  /  AlkPhos  83  06-02          RADIOLOGY & ADDITIONAL TESTS:  Reviewed

## 2023-06-03 NOTE — DISCHARGE NOTE PROVIDER - HOSPITAL COURSE
63 yr old male presenting with dyspnea and lightheadedness, found have atrial fibrillation on tele.    Problem/Plan - 1:  ·  Problem: Paroxysmal atrial fibrillation.   ·  Plan: patient found to have short bursts of atrial fibrillation (-150) on tele, likely etiology of lightheadedness and shortness of breath. Last in atrial fibrillation 6/3 AM. Likely not new issue given patient on Eliquis as outpatient. Patient however does not recall a diagnosis of afib, states he was started on Eliquis by a cardiologist and he is unsure why and has been loss to follow up. Denies history of PE and DVT.    - continue w/ Eliquis   - up-titrated metoprolol from 25mg daily -> 50mg daily  - CXR clear   - ECHO pending   - monitor on tele.     Problem/Plan - 2:  ·  Problem: Benign essential HTN.   ·  Plan: Continue valsartan 160mg daily and metoprolol 50mg daily.     Problem/Plan - 3:  ·  Problem: HLD (hyperlipidemia).   ·  Plan: Chronic stable  Continue atorvastatin 40mg   Lipid panel appreciated.     Problem/Plan - 4:  ·  Problem: Type 2 diabetes mellitus treated without insulin.   ·  Plan: Blood glucose elevated  A1C --> 8.9  Hold home metformin  LDCS with diabetic diet  Added on Lantus 12u and ADMELOG 4u. 63 yr old male with hx of systolic CHF, HTN, HLD, CVA presenting with dyspnea and lightheadedness, likely vasovagal event    Problem/Plan - 1:  ·  Problem: Dizziness   ·  Plan: like vasovagal event vs. uncontrolled HTN. no e/o cardiac ischemia new structural heart disease or CHF exacerbation   - up-titrated metoprolol from 25mg daily -> 50mg daily  - CXR clear   - ECHO- reduced LVEF, no significant change since 3/2023  - no significant arrhythmia on tele- pt is noted on apixaban at home. unclear if hx of pAfib. Pt is instructed to f/u with his cardiologist to discuss if the medication needs to be continued.   - cardiology consulted- no ischemic workup indicated. f/u cardiology outpt          Problem/Plan - 2:  ·  Problem: chronic systolic CHF   ·  Plan: no e/o fluid overload. c/w home regimen valsartan, metoprolol.      Problem/Plan - 3:  ·  Problem: Benign essential HTN.   ·  Plan: Continue valsartan 160mg daily and metoprolol 50mg daily.     Problem/Plan - 4:  ·  Problem: HLD (hyperlipidemia).   ·  Plan: Chronic stable  Continue atorvastatin 40mg        Problem/Plan - 5:  ·  Problem: Type 2 diabetes mellitus treated without insulin.   ·  Plan: Blood glucose elevated  A1C --> 8.9  Home metformin held during admission, pt received basal bolus insulin. Resume home oral meds on dc   LDCS with diabetic diet       Problem/Plan - 6:  ·  Problem: CKD3  ·  Plan: Cr 1.2-1.5 f/u PCP outpt    63 yr old male with hx of systolic CHF, HTN, HLD, CVA presenting with dyspnea and lightheadedness, likely vasovagal event    Problem/Plan - 1:  ·  Problem: Dizziness   ·  Plan: like vasovagal event vs. uncontrolled HTN. no e/o cardiac ischemia new structural heart disease or CHF exacerbation   - up-titrated metoprolol from 25mg daily -> 50mg daily  - CXR clear   - ECHO- reduced LVEF, no significant change since 3/2023  - no significant arrhythmia on tele- pt is noted on apixaban at home. unclear if hx of pAfib. Pt is instructed to f/u with his cardiologist to discuss if the medication needs to be continued.   - cardiology consulted- no ischemic workup indicated. f/u cardiology outpt          Problem/Plan - 2:  ·  Problem: chronic systolic CHF   ·  Plan: no e/o fluid overload. c/w home regimen valsartan, metoprolol.      Problem/Plan - 3:  ·  Problem: Benign essential HTN.   ·  Plan: Continue valsartan 160mg daily and metoprolol 50mg daily.     Problem/Plan - 4:  ·  Problem: HLD (hyperlipidemia).   ·  Plan: Chronic stable  Continue atorvastatin 40mg        Problem/Plan - 5:  ·  Problem: Type 2 diabetes mellitus treated without insulin.   ·  Plan: Blood glucose elevated  A1C --> 8.9  Home metformin held during admission, pt received basal bolus insulin. Resume home oral meds on dc   LDCS with diabetic diet       Problem/Plan - 6:  ·  Problem: CKD3  ·  Plan: Cr 1.2-1.5 f/u PCP outpt     Case discussed with DR Ross. Patient stable for discharge home

## 2023-06-03 NOTE — PATIENT PROFILE ADULT - FALL HARM RISK - CONCLUSION
Patient came in from home via EMS c/o worsening SOB of the past several days. Pt hx COPD arrives to ER w/audible wheezing and increased work of breathing. EMS gave PTA 1-DuoNeb tx, 1 Albuterol, 125mg Solu-medrole.
Fall with Harm Risk

## 2023-06-03 NOTE — DISCHARGE NOTE PROVIDER - NSDCMRMEDTOKEN_GEN_ALL_CORE_FT
atorvastatin 40 mg oral tablet: 1 tab(s) orally once a day  Eliquis 5 mg oral tablet: 1 tab(s) orally 2 times a day  metFORMIN 500 mg oral tablet: 1 tab(s) orally 2 times a day.   metoprolol succinate 25 mg oral tablet, extended release: 1 tab(s) orally once a day (at bedtime)  valsartan 160 mg oral tablet: 1 tab(s) orally once a day   atorvastatin 40 mg oral tablet: 1 tab(s) orally once a day  Eliquis 5 mg oral tablet: 1 tab(s) orally 2 times a day  metFORMIN 500 mg oral tablet: 1 tab(s) orally 2 times a day.   Metoprolol Succinate ER 50 mg oral tablet, extended release: 1 tab(s) orally once a day  Protonix 40 mg oral delayed release tablet: 1 tab(s) orally once a day (before a meal)  valsartan 160 mg oral tablet: 1 tab(s) orally once a day

## 2023-06-03 NOTE — PROGRESS NOTE ADULT - ASSESSMENT
63 y/o M with PMH of CVA( 10 years ago without any residual), HTN, HLD, DM type II, Chronic everyday smoker, NICM (with EF of 32%), taking Eliquis for unknown reason.  Presents with CALDWELL. Initial enzymes are unremarkable. [facility] for [facility] for abnormal stress test in March earlier this year, nonobstructive CAD. Currently, symptoms resolved.    Recs:  - echo  - No indication for eliquis, obtain collateral to see indication. Patient denies history of DVT/PE, no history of recorded afib and currently in NSR. Monitor on tele  - con't Aspirin, statin, Toprol and valsartan at above doses    Venu Waterman MD  Cardiology Fellow PGY-6  [facility]    Notes are not final until signed by attending  For all consults and questions:  [email or web address]   Login: [email or web address]

## 2023-06-03 NOTE — PATIENT PROFILE ADULT - FALL HARM RISK - HARM RISK INTERVENTIONS

## 2023-06-03 NOTE — PROGRESS NOTE ADULT - PROBLEM SELECTOR PLAN 1
patient found to have short bursts of atrial fibrillation (-150) on tele, likely etiology of lightheadedness and shortness of breath. Last in atrial fibrillation 6/3 AM. Likely not new issue given patient on Eliquis as outpatient. Patient however does not recall a diagnosis of afib, states he was started on Eliquis by a cardiologist and he is unsure why and has been loss to follow up. Denies history of PE and DVT.    - continue w/ Eliquis   - up-titrated metoprolol from 25mg daily -> 50mg daily  - CXR clear   - ECHO pending   - monitor on tele

## 2023-06-04 LAB
ANION GAP SERPL CALC-SCNC: 12 MMOL/L — SIGNIFICANT CHANGE UP (ref 7–14)
BUN SERPL-MCNC: 18 MG/DL — SIGNIFICANT CHANGE UP (ref 7–23)
CALCIUM SERPL-MCNC: 9.2 MG/DL — SIGNIFICANT CHANGE UP (ref 8.4–10.5)
CHLORIDE SERPL-SCNC: 103 MMOL/L — SIGNIFICANT CHANGE UP (ref 98–107)
CO2 SERPL-SCNC: 20 MMOL/L — LOW (ref 22–31)
CREAT SERPL-MCNC: 1.23 MG/DL — SIGNIFICANT CHANGE UP (ref 0.5–1.3)
EGFR: 66 ML/MIN/1.73M2 — SIGNIFICANT CHANGE UP
GLUCOSE BLDC GLUCOMTR-MCNC: 137 MG/DL — HIGH (ref 70–99)
GLUCOSE BLDC GLUCOMTR-MCNC: 164 MG/DL — HIGH (ref 70–99)
GLUCOSE BLDC GLUCOMTR-MCNC: 181 MG/DL — HIGH (ref 70–99)
GLUCOSE BLDC GLUCOMTR-MCNC: 226 MG/DL — HIGH (ref 70–99)
GLUCOSE SERPL-MCNC: 168 MG/DL — HIGH (ref 70–99)
MAGNESIUM SERPL-MCNC: 1.8 MG/DL — SIGNIFICANT CHANGE UP (ref 1.6–2.6)
PHOSPHATE SERPL-MCNC: 3.3 MG/DL — SIGNIFICANT CHANGE UP (ref 2.5–4.5)
POTASSIUM SERPL-MCNC: 4 MMOL/L — SIGNIFICANT CHANGE UP (ref 3.5–5.3)
POTASSIUM SERPL-SCNC: 4 MMOL/L — SIGNIFICANT CHANGE UP (ref 3.5–5.3)
SODIUM SERPL-SCNC: 135 MMOL/L — SIGNIFICANT CHANGE UP (ref 135–145)

## 2023-06-04 PROCEDURE — 93306 TTE W/DOPPLER COMPLETE: CPT | Mod: 26

## 2023-06-04 PROCEDURE — 99232 SBSQ HOSP IP/OBS MODERATE 35: CPT

## 2023-06-04 RX ADMIN — Medication 1: at 08:32

## 2023-06-04 RX ADMIN — Medication 4 UNIT(S): at 17:42

## 2023-06-04 RX ADMIN — Medication 50 MILLIGRAM(S): at 06:33

## 2023-06-04 RX ADMIN — INSULIN GLARGINE 12 UNIT(S): 100 INJECTION, SOLUTION SUBCUTANEOUS at 22:05

## 2023-06-04 RX ADMIN — ATORVASTATIN CALCIUM 40 MILLIGRAM(S): 80 TABLET, FILM COATED ORAL at 22:06

## 2023-06-04 RX ADMIN — Medication 4 UNIT(S): at 12:08

## 2023-06-04 RX ADMIN — PANTOPRAZOLE SODIUM 40 MILLIGRAM(S): 20 TABLET, DELAYED RELEASE ORAL at 06:34

## 2023-06-04 RX ADMIN — VALSARTAN 160 MILLIGRAM(S): 80 TABLET ORAL at 06:33

## 2023-06-04 RX ADMIN — APIXABAN 5 MILLIGRAM(S): 2.5 TABLET, FILM COATED ORAL at 06:33

## 2023-06-04 RX ADMIN — APIXABAN 5 MILLIGRAM(S): 2.5 TABLET, FILM COATED ORAL at 17:44

## 2023-06-04 RX ADMIN — Medication 4 UNIT(S): at 08:32

## 2023-06-04 RX ADMIN — Medication 2: at 17:42

## 2023-06-04 NOTE — PROGRESS NOTE ADULT - PROBLEM SELECTOR PLAN 1
patient found to have short bursts of atrial fibrillation (-150) on tele, likely etiology of lightheadedness and shortness of breath. Last in atrial fibrillation 6/3 AM. Likely not new issue given patient on Eliquis as outpatient. Patient however does not recall a diagnosis of afib, states he was started on Eliquis by a cardiologist and he is unsure why and has been loss to follow up. Denies history of PE and DVT.    - continue w/ Eliquis   - up-titrated metoprolol from 25mg daily -> 50mg daily  - CXR clear   - ECHO pending   - monitor on tele patient found to have short bursts of atrial fibrillation (-150) on tele, likely etiology of lightheadedness and shortness of breath. Last in atrial fibrillation 6/3 AM. Likely not new issue given patient on Eliquis as outpatient. Patient however does not recall a diagnosis of afib, states he was started on Eliquis by a cardiologist and he is unsure why and has been loss to follow up. Denies history of PE and DVT.    - continue w/ Eliquis   - up-titrated metoprolol from 25mg daily -> 50mg daily  - CXR clear   - ECHO --> TTE w/ Moderate to severe global  left ventricular systolic dysfunction. Ejection Fraction (Modified Smith Rule): 36 %  - monitor on tele

## 2023-06-04 NOTE — PROGRESS NOTE ADULT - PROBLEM SELECTOR PLAN 2
Continue valsartan 160mg daily and metoprolol 50mg daily
Continue valsartan 160mg daily and metoprolol 50mg daily

## 2023-06-04 NOTE — PROGRESS NOTE ADULT - PROBLEM SELECTOR PLAN 4
Blood glucose elevated  A1C --> 8.9  Hold home metformin  LDCS with diabetic diet  Added on Lantus 12u and ADMELOG 4u
Blood glucose elevated  A1C --> 8.9  Hold home metformin  LDCS with diabetic diet  Added on Lantus 12u and ADMELOG 4u

## 2023-06-04 NOTE — PROGRESS NOTE ADULT - TIME BILLING
Jeff Rizzo is a 62 year old man with history of CVA (10 yrs ago without any residual symptoms), HTN, HLD, DM II, Chronic daily smoker, and NICM(LVEF of 32%), on apixaban (Eliquis) for reason not currently known. He p/w CALDWELL. Initial cardiac enzymes are unremarkable. LHC 3/20/23 showed 20% LMain, 50% pLAD, 60% mLAD, 60% oD1, 50% pLCX, and 40% OM1. Current serum proBNP 231 pg/mL.
Time spent on review of vital signs, labs, prior documentation, consultant notes. Patient interviewed and examined during this encounter. Plan of care was discussed with patient and ACP. Encounter documented.
Time spent on review of vital signs, labs, prior documentation, consultant notes. Patient interviewed and examined during this encounter. Plan of care was discussed with patient and ACP. Encounter documented.

## 2023-06-04 NOTE — PROGRESS NOTE ADULT - SUBJECTIVE AND OBJECTIVE BOX
Aicha Sommer MD   [employer] Teams, Pager [phone]    INTERVAL HPI/OVERNIGHT EVENTS:  Patient was seen and examined at bedside. As per nurse and patient, no o/n events, patient resting comfortably. No complaints at this time. Patient denies: fever, chills, dizziness, weakness, HA, Changes in vision, CP, palpitations, SOB, cough, N/V/D/C, dysuria, changes in bowel movements, LE edema. ROS otherwise negative.    VITAL SIGNS:  T(F): 98.6 (06-04-23 @ 06:30)  HR: 75 (06-04-23 @ 06:30)  BP: 133/90 (06-04-23 @ 06:30)  RR: 17 (06-04-23 @ 06:30)  SpO2: 100% (06-04-23 @ 06:30)  Wt(kg): --    PHYSICAL EXAM:    Constitutional: WDWN, NAD  HEENT: PERRL, EOMI, sclera non-icteric, neck supple, trachea midline, no masses, no JVD, MMM, good dentition  Respiratory: CTA b/l, good air entry b/l, no wheezing, no rhonchi, no rales, without accessory muscle use and no intercostal retractions  Cardiovascular: RRR, normal S1S2, no M/R/G  Gastrointestinal: soft, NTND, no masses palpable, BS normal  Extremities: Warm, well perfused, pulses equal bilateral upper and lower extremities, no edema, no clubbing  Neurological: AAOx3, CN Grossly intact  Skin: Normal temperature, warm, dry    MEDICATIONS  (STANDING):  apixaban 5 milliGRAM(s) Oral every 12 hours  atorvastatin 40 milliGRAM(s) Oral at bedtime  dextrose 5%. 1000 milliLiter(s) (100 mL/Hr) IV Continuous <Continuous>  dextrose 5%. 1000 milliLiter(s) (50 mL/Hr) IV Continuous <Continuous>  dextrose 50% Injectable 12.5 Gram(s) IV Push once  dextrose 50% Injectable 25 Gram(s) IV Push once  dextrose 50% Injectable 25 Gram(s) IV Push once  glucagon  Injectable 1 milliGRAM(s) IntraMuscular once  insulin glargine Injectable (LANTUS) 12 Unit(s) SubCutaneous at bedtime  insulin lispro (ADMELOG) corrective regimen sliding scale   SubCutaneous three times a day before meals  insulin lispro (ADMELOG) corrective regimen sliding scale   SubCutaneous at bedtime  insulin lispro Injectable (ADMELOG) 4 Unit(s) SubCutaneous three times a day before meals  metoprolol succinate ER 50 milliGRAM(s) Oral daily  pantoprazole    Tablet 40 milliGRAM(s) Oral before breakfast  valsartan 160 milliGRAM(s) Oral daily    MEDICATIONS  (PRN):  acetaminophen     Tablet .. 650 milliGRAM(s) Oral every 6 hours PRN Temp greater or equal to 38C (100.4F), Mild Pain (1 - 3)  aluminum hydroxide/magnesium hydroxide/simethicone Suspension 30 milliLiter(s) Oral every 4 hours PRN Dyspepsia  dextrose Oral Gel 15 Gram(s) Oral once PRN Blood Glucose LESS THAN 70 milliGRAM(s)/deciliter  melatonin 3 milliGRAM(s) Oral at bedtime PRN Insomnia  ondansetron Injectable 4 milliGRAM(s) IV Push every 8 hours PRN Nausea and/or Vomiting      Allergies    No Known Allergies    Intolerances        LABS:                        13.4   7.80  )-----------( 273      ( 03 Jun 2023 05:51 )             40.1     06-04    135  |  103  |  18  ----------------------------<  168<H>  4.0   |  20<L>  |  1.23    Ca    9.2      04 Jun 2023 06:31  Phos  3.3     06-04  Mg     1.80     06-04    TPro  6.7  /  Alb  4.3  /  TBili  0.9  /  DBili  x   /  AST  18  /  ALT  19  /  AlkPhos  83  06-02          RADIOLOGY & ADDITIONAL TESTS:  Reviewed Aicha Sommer MD   [employer] Teams, Pager [phone]    INTERVAL HPI/OVERNIGHT EVENTS:  Patient was seen and examined, w/o chest pain, shortness of breath, or pre-syncopal symptoms. TTE w/ Moderate to severe global  left ventricular systolic dysfunction. Ejection Fraction (Modified Smith Rule): 36 %  Cardiology to re-eval in AM may need ischemic eval.     VITAL SIGNS:  T(F): 98.6 (06-04-23 @ 06:30)  HR: 75 (06-04-23 @ 06:30)  BP: 133/90 (06-04-23 @ 06:30)  RR: 17 (06-04-23 @ 06:30)  SpO2: 100% (06-04-23 @ 06:30)  Wt(kg): --    PHYSICAL EXAM:  GENERAL: NAD, comfortable at bedside   HEAD:  Atraumatic, Normocephalic  EYES: EOMI, PERRL, conjunctiva and sclera clear  NECK: Supple, No JVD  CHEST/LUNG: Clear to auscultation bilaterally; No wheezes, rales or rhonchi  HEART: Regular rate and rhythm; No murmurs, rubs, or gallops, (+)S1, S2  ABDOMEN: Soft, Nontender, Nondistended; Normal Bowel sounds   EXTREMITIES:  2+ Peripheral Pulses, No clubbing, cyanosis, or edema  PSYCH: normal mood and affect  NEUROLOGY: AAOx3, non-focal  SKIN: No rashes or lesions      MEDICATIONS  (STANDING):  apixaban 5 milliGRAM(s) Oral every 12 hours  atorvastatin 40 milliGRAM(s) Oral at bedtime  dextrose 5%. 1000 milliLiter(s) (100 mL/Hr) IV Continuous <Continuous>  dextrose 5%. 1000 milliLiter(s) (50 mL/Hr) IV Continuous <Continuous>  dextrose 50% Injectable 12.5 Gram(s) IV Push once  dextrose 50% Injectable 25 Gram(s) IV Push once  dextrose 50% Injectable 25 Gram(s) IV Push once  glucagon  Injectable 1 milliGRAM(s) IntraMuscular once  insulin glargine Injectable (LANTUS) 12 Unit(s) SubCutaneous at bedtime  insulin lispro (ADMELOG) corrective regimen sliding scale   SubCutaneous three times a day before meals  insulin lispro (ADMELOG) corrective regimen sliding scale   SubCutaneous at bedtime  insulin lispro Injectable (ADMELOG) 4 Unit(s) SubCutaneous three times a day before meals  metoprolol succinate ER 50 milliGRAM(s) Oral daily  pantoprazole    Tablet 40 milliGRAM(s) Oral before breakfast  valsartan 160 milliGRAM(s) Oral daily    MEDICATIONS  (PRN):  acetaminophen     Tablet .. 650 milliGRAM(s) Oral every 6 hours PRN Temp greater or equal to 38C (100.4F), Mild Pain (1 - 3)  aluminum hydroxide/magnesium hydroxide/simethicone Suspension 30 milliLiter(s) Oral every 4 hours PRN Dyspepsia  dextrose Oral Gel 15 Gram(s) Oral once PRN Blood Glucose LESS THAN 70 milliGRAM(s)/deciliter  melatonin 3 milliGRAM(s) Oral at bedtime PRN Insomnia  ondansetron Injectable 4 milliGRAM(s) IV Push every 8 hours PRN Nausea and/or Vomiting      Allergies    No Known Allergies    Intolerances        LABS:                        13.4   7.80  )-----------( 273      ( 03 Jun 2023 05:51 )             40.1     06-04    135  |  103  |  18  ----------------------------<  168<H>  4.0   |  20<L>  |  1.23    Ca    9.2      04 Jun 2023 06:31  Phos  3.3     06-04  Mg     1.80     06-04    TPro  6.7  /  Alb  4.3  /  TBili  0.9  /  DBili  x   /  AST  18  /  ALT  19  /  AlkPhos  83  06-02          RADIOLOGY & ADDITIONAL TESTS:  Reviewed

## 2023-06-05 ENCOUNTER — TRANSCRIPTION ENCOUNTER (OUTPATIENT)
Age: 63
End: 2023-06-05

## 2023-06-05 VITALS
SYSTOLIC BLOOD PRESSURE: 136 MMHG | DIASTOLIC BLOOD PRESSURE: 86 MMHG | RESPIRATION RATE: 16 BRPM | HEART RATE: 82 BPM | TEMPERATURE: 98 F | OXYGEN SATURATION: 99 %

## 2023-06-05 LAB
ANION GAP SERPL CALC-SCNC: 13 MMOL/L — SIGNIFICANT CHANGE UP (ref 7–14)
BUN SERPL-MCNC: 24 MG/DL — HIGH (ref 7–23)
CALCIUM SERPL-MCNC: 9.2 MG/DL — SIGNIFICANT CHANGE UP (ref 8.4–10.5)
CHLORIDE SERPL-SCNC: 103 MMOL/L — SIGNIFICANT CHANGE UP (ref 98–107)
CO2 SERPL-SCNC: 21 MMOL/L — LOW (ref 22–31)
CREAT SERPL-MCNC: 1.57 MG/DL — HIGH (ref 0.5–1.3)
EGFR: 49 ML/MIN/1.73M2 — LOW
GLUCOSE BLDC GLUCOMTR-MCNC: 178 MG/DL — HIGH (ref 70–99)
GLUCOSE BLDC GLUCOMTR-MCNC: 214 MG/DL — HIGH (ref 70–99)
GLUCOSE SERPL-MCNC: 240 MG/DL — HIGH (ref 70–99)
HCT VFR BLD CALC: 38.1 % — LOW (ref 39–50)
HGB BLD-MCNC: 12.9 G/DL — LOW (ref 13–17)
MCHC RBC-ENTMCNC: 28.2 PG — SIGNIFICANT CHANGE UP (ref 27–34)
MCHC RBC-ENTMCNC: 33.9 GM/DL — SIGNIFICANT CHANGE UP (ref 32–36)
MCV RBC AUTO: 83.2 FL — SIGNIFICANT CHANGE UP (ref 80–100)
NRBC # BLD: 0 /100 WBCS — SIGNIFICANT CHANGE UP (ref 0–0)
NRBC # FLD: 0 K/UL — SIGNIFICANT CHANGE UP (ref 0–0)
PLATELET # BLD AUTO: 278 K/UL — SIGNIFICANT CHANGE UP (ref 150–400)
POTASSIUM SERPL-MCNC: 4.3 MMOL/L — SIGNIFICANT CHANGE UP (ref 3.5–5.3)
POTASSIUM SERPL-SCNC: 4.3 MMOL/L — SIGNIFICANT CHANGE UP (ref 3.5–5.3)
RBC # BLD: 4.58 M/UL — SIGNIFICANT CHANGE UP (ref 4.2–5.8)
RBC # FLD: 14 % — SIGNIFICANT CHANGE UP (ref 10.3–14.5)
SODIUM SERPL-SCNC: 137 MMOL/L — SIGNIFICANT CHANGE UP (ref 135–145)
WBC # BLD: 7.28 K/UL — SIGNIFICANT CHANGE UP (ref 3.8–10.5)
WBC # FLD AUTO: 7.28 K/UL — SIGNIFICANT CHANGE UP (ref 3.8–10.5)

## 2023-06-05 PROCEDURE — 99232 SBSQ HOSP IP/OBS MODERATE 35: CPT

## 2023-06-05 PROCEDURE — 99239 HOSP IP/OBS DSCHRG MGMT >30: CPT

## 2023-06-05 RX ORDER — METOPROLOL TARTRATE 50 MG
1 TABLET ORAL
Qty: 0 | Refills: 0 | DISCHARGE

## 2023-06-05 RX ORDER — PANTOPRAZOLE SODIUM 20 MG/1
1 TABLET, DELAYED RELEASE ORAL
Qty: 30 | Refills: 0
Start: 2023-06-05 | End: 2023-07-04

## 2023-06-05 RX ORDER — METOPROLOL TARTRATE 50 MG
1 TABLET ORAL
Qty: 30 | Refills: 0
Start: 2023-06-05 | End: 2023-07-04

## 2023-06-05 RX ADMIN — Medication 1: at 08:34

## 2023-06-05 RX ADMIN — Medication 2: at 12:02

## 2023-06-05 RX ADMIN — Medication 50 MILLIGRAM(S): at 05:55

## 2023-06-05 RX ADMIN — PANTOPRAZOLE SODIUM 40 MILLIGRAM(S): 20 TABLET, DELAYED RELEASE ORAL at 12:00

## 2023-06-05 RX ADMIN — VALSARTAN 160 MILLIGRAM(S): 80 TABLET ORAL at 05:55

## 2023-06-05 RX ADMIN — Medication 4 UNIT(S): at 12:02

## 2023-06-05 RX ADMIN — Medication 4 UNIT(S): at 08:34

## 2023-06-05 RX ADMIN — APIXABAN 5 MILLIGRAM(S): 2.5 TABLET, FILM COATED ORAL at 05:55

## 2023-06-05 NOTE — DISCHARGE NOTE NURSING/CASE MANAGEMENT/SOCIAL WORK - PATIENT PORTAL LINK FT
You can access the FollowMyHealth Patient Portal offered by [facility] by registering at the following website: [email or web address]. By joining [email or web address] FollowMyHealth portal, you will also be able to view your health information using other applications (apps) compatible with our system.

## 2023-06-05 NOTE — CHART NOTE - NSCHARTNOTEFT_GEN_A_CORE
pt seen and examined. vitals, labs, tele, cardiology note reviewed. discussed with pt since he does not have hx of DVT/PE and we recommend stop Eliquis if he has no hx of pAfib. currently pt is not sure if he ever had afib- he agreed to follow up with his cardiologist who prescribed him Eliquis later this week to further discuss the plan     pt is stable for discharge home today. total time spent on dc 37min

## 2023-06-05 NOTE — PROGRESS NOTE ADULT - ATTENDING COMMENTS
Jeff Rizzo is a 62 year old man with history of CVA (10 yrs ago without any residual symptoms), HTN, HLD, DM II, Chronic daily smoker, and NICM(LVEF of 32%), on apixaban (Eliquis) for reason not currently known. He p/w CALDWELL. Initial cardiac enzymes are unremarkable. LHC 3/20/23 showed 20% LMain, 50% pLAD, 60% mLAD, 60% oD1, 50% pLCX, and 40% OM1. Current serum proBNP 231 pg/mL. Initial management to include serial ECG and HSTropT. Obtain TTE for re-assessment of LV size and systolic function. Up titrate atorvastatin to 80 mg daily at bedtime, maintain aspirin 81 mg daily. Maintain metoprolol succinate ER 25 mg daily. Maintain valsartan 160 mg daily. Maintain metformin 500 mg 2X daily. There is no current indication to suggest resuming apixaban (Eliquis) at this time
symptoms resolved.  can discharge from cardiac perspective.  in the absence of clear indication for DOAC would not resume.  GDMT optimization as outpt

## 2023-06-05 NOTE — PROGRESS NOTE ADULT - ASSESSMENT
61 y/o M with PMH of CVA( 10 years ago without any residual), HTN, HLD, DM type II, Chronic everyday smoker, NICM (with EF of 32%), taking Eliquis for unknown reason.  Presents with CALDWELL. Initial enzymes are unremarkable. J for [facility] for abnormal stress test in March earlier this year, nonobstructive CAD. Currently, symptoms resolved.    Recs:  - echo  - No indication for eliquis, obtain collateral to see indication. Patient denies history of DVT/PE, no history of recorded afib and currently in NSR. Monitor on tele  - con't Aspirin, statin, Toprol and valsartan at above doses 63 y/o M with PMH of CVA( 10 years ago without any residual), HTN, HLD, DM type II, Chronic everyday smoker, NICM (with EF of 32%), taking Eliquis for unknown reason. He p/w CALDWELL. Initial cardiac enzymes are unremarkable. LHC 3/20/23 showed 20% LMain, 50% pLAD, 60% mLAD, 60% oD1, 50% pLCX, and 40% OM1. Current serum proBNP 231 pg/mL.     Recs:  - Repeat TTE done as above, similar to prior known cardiomyopathy without new WMAs  - No plan for further ischemic evaluation  - No indication for eliquis, obtain collateral to see indication. Patient denies history of DVT/PE, no history of recorded afib and currently in NSR. Monitor on tele  - con't Aspirin, statin, Toprol 50 mg qd and valsartan 160 mg qd at discharge  - Please arrange followup with cardiology 1-2 weeks within discharge  - Cardiology to sign off at this time

## 2023-06-05 NOTE — DISCHARGE NOTE NURSING/CASE MANAGEMENT/SOCIAL WORK - NSDCPEFALRISK_GEN_ALL_CORE
For information on Fall & Injury Prevention, visit: [email or web address] OR  [email or web address] OR  https://www.cdc.gov/steadi/patient.html

## 2023-06-05 NOTE — PROGRESS NOTE ADULT - SUBJECTIVE AND OBJECTIVE BOX
Patient seen and examined at bedside.    Overnight Events: No acute events overnight. Denies chest pain or SOB      REVIEW OF SYSTEMS:  Constitutional:     [ x] negative [ ] fevers [ ] chills [ ] weight loss [ ] weight gain  HEENT:                  [ x] negative [ ] dry eyes [ ] eye irritation [ ] postnasal drip [ ] nasal congestion  CV:                         [x ] negative  [ ] chest pain [ ] orthopnea [ ] palpitations [ ] murmur  Resp:                     [ x] negative [ ] cough [ ] shortness of breath [ ] dyspnea [ ] wheezing [ ] sputum [ ]hemoptysis  GI:                          [ x] negative [ ] nausea [ ] vomiting [ ] diarrhea [ ] constipation [ ] abd pain [ ] dysphagia   :                        [ x] negative [ ] dysuria [ ] nocturia [ ] hematuria [ ] increased urinary frequency  Musculoskeletal: [ x] negative [ ] back pain [ ] myalgias [ ] arthralgias [ ] fracture  Skin:                       [ x] negative [ ] rash [ ] itch  Neurological:        [ x] negative [ ] headache [ ] dizziness [ ] syncope [ ] weakness [ ] numbness  Psychiatric:           [ x] negative [ ] anxiety [ ] depression  Endocrine:            [ x] negative [ ] diabetes [ ] thyroid problem  Heme/Lymph:      [ x] negative [ ] anemia [ ] bleeding problem  Allergic/Immune: [x ] negative [ ] itchy eyes [ ] nasal discharge [ ] hives [ ] angioedema    [x ] All other systems negative  [ ] Unable to assess ROS due to    Current Meds:  acetaminophen     Tablet .. 650 milliGRAM(s) Oral every 6 hours PRN  aluminum hydroxide/magnesium hydroxide/simethicone Suspension 30 milliLiter(s) Oral every 4 hours PRN  apixaban 5 milliGRAM(s) Oral every 12 hours  atorvastatin 40 milliGRAM(s) Oral at bedtime  dextrose 5%. 1000 milliLiter(s) IV Continuous <Continuous>  dextrose 5%. 1000 milliLiter(s) IV Continuous <Continuous>  dextrose 50% Injectable 25 Gram(s) IV Push once  dextrose 50% Injectable 12.5 Gram(s) IV Push once  dextrose 50% Injectable 25 Gram(s) IV Push once  dextrose Oral Gel 15 Gram(s) Oral once PRN  glucagon  Injectable 1 milliGRAM(s) IntraMuscular once  insulin glargine Injectable (LANTUS) 12 Unit(s) SubCutaneous at bedtime  insulin lispro (ADMELOG) corrective regimen sliding scale   SubCutaneous three times a day before meals  insulin lispro (ADMELOG) corrective regimen sliding scale   SubCutaneous at bedtime  insulin lispro Injectable (ADMELOG) 4 Unit(s) SubCutaneous three times a day before meals  melatonin 3 milliGRAM(s) Oral at bedtime PRN  metoprolol succinate ER 50 milliGRAM(s) Oral daily  ondansetron Injectable 4 milliGRAM(s) IV Push every 8 hours PRN  pantoprazole    Tablet 40 milliGRAM(s) Oral before breakfast  valsartan 160 milliGRAM(s) Oral daily      PAST MEDICAL & SURGICAL HISTORY:  DM (diabetes mellitus)  type 2      Smoker  for 45 years, down to 1/2 a pack daily      Cerebrovascular accident (CVA)  2009 - no residuals      Eczema  left arm      Benign essential HTN      NICM (nonischemic cardiomyopathy)      S/P repair of pectoralis muscle tear  due to lift weighting 2012          Vitals:  T(F): 98.1 (06-05), Max: 98.1 (06-04)  HR: 74 (06-05) (74 - 77)  BP: 134/89 (06-05) (120/92 - 134/89)  RR: 17 (06-05)  SpO2: 99% (06-05)  I&O's Summary      Physical Exam:  Appearance: No acute distress; well appearing  Eyes: PERRL, EOMI, pink conjunctiva  HENT: Normal oral mucosa  Cardiovascular: RRR, S1, S2, no murmurs, rubs, or gallops; no edema; no JVD  Respiratory: Clear to auscultation bilaterally  Gastrointestinal: soft, non-tender, non-distended with normal bowel sounds  Musculoskeletal: No clubbing; no joint deformity   Neurologic: Non-focal  Lymphatic: No lymphadenopathy  Psychiatry: AAOx3, mood & affect appropriate  Skin: No rashes, ecchymoses, or cyanosis      06-04    135  |  103  |  18  ----------------------------<  168<H>  4.0   |  20<L>  |  1.23    Ca    9.2      04 Jun 2023 06:31  Phos  3.3     06-04  Mg     1.80     06-04                       Patient seen and examined at bedside.    Overnight Events: No acute events overnight. Denies chest pain or SOB      REVIEW OF SYSTEMS:  Constitutional:     [ x] negative [ ] fevers [ ] chills [ ] weight loss [ ] weight gain  HEENT:                  [ x] negative [ ] dry eyes [ ] eye irritation [ ] postnasal drip [ ] nasal congestion  CV:                         [x ] negative  [ ] chest pain [ ] orthopnea [ ] palpitations [ ] murmur  Resp:                     [ x] negative [ ] cough [ ] shortness of breath [ ] dyspnea [ ] wheezing [ ] sputum [ ]hemoptysis  GI:                          [ x] negative [ ] nausea [ ] vomiting [ ] diarrhea [ ] constipation [ ] abd pain [ ] dysphagia   :                        [ x] negative [ ] dysuria [ ] nocturia [ ] hematuria [ ] increased urinary frequency  Musculoskeletal: [ x] negative [ ] back pain [ ] myalgias [ ] arthralgias [ ] fracture  Skin:                       [ x] negative [ ] rash [ ] itch  Neurological:        [ x] negative [ ] headache [ ] dizziness [ ] syncope [ ] weakness [ ] numbness  Psychiatric:           [ x] negative [ ] anxiety [ ] depression  Endocrine:            [ x] negative [ ] diabetes [ ] thyroid problem  Heme/Lymph:      [ x] negative [ ] anemia [ ] bleeding problem  Allergic/Immune: [x ] negative [ ] itchy eyes [ ] nasal discharge [ ] hives [ ] angioedema    [x ] All other systems negative  [ ] Unable to assess ROS due to    Current Meds:  acetaminophen     Tablet .. 650 milliGRAM(s) Oral every 6 hours PRN  aluminum hydroxide/magnesium hydroxide/simethicone Suspension 30 milliLiter(s) Oral every 4 hours PRN  apixaban 5 milliGRAM(s) Oral every 12 hours  atorvastatin 40 milliGRAM(s) Oral at bedtime  dextrose 5%. 1000 milliLiter(s) IV Continuous <Continuous>  dextrose 5%. 1000 milliLiter(s) IV Continuous <Continuous>  dextrose 50% Injectable 25 Gram(s) IV Push once  dextrose 50% Injectable 12.5 Gram(s) IV Push once  dextrose 50% Injectable 25 Gram(s) IV Push once  dextrose Oral Gel 15 Gram(s) Oral once PRN  glucagon  Injectable 1 milliGRAM(s) IntraMuscular once  insulin glargine Injectable (LANTUS) 12 Unit(s) SubCutaneous at bedtime  insulin lispro (ADMELOG) corrective regimen sliding scale   SubCutaneous three times a day before meals  insulin lispro (ADMELOG) corrective regimen sliding scale   SubCutaneous at bedtime  insulin lispro Injectable (ADMELOG) 4 Unit(s) SubCutaneous three times a day before meals  melatonin 3 milliGRAM(s) Oral at bedtime PRN  metoprolol succinate ER 50 milliGRAM(s) Oral daily  ondansetron Injectable 4 milliGRAM(s) IV Push every 8 hours PRN  pantoprazole    Tablet 40 milliGRAM(s) Oral before breakfast  valsartan 160 milliGRAM(s) Oral daily      PAST MEDICAL & SURGICAL HISTORY:  DM (diabetes mellitus)  type 2      Smoker  for 45 years, down to 1/2 a pack daily      Cerebrovascular accident (CVA)  2009 - no residuals      Eczema  left arm      Benign essential HTN      NICM (nonischemic cardiomyopathy)      S/P repair of pectoralis muscle tear  due to lift weighting 2012          Vitals:  T(F): 98.1 (06-05), Max: 98.1 (06-04)  HR: 74 (06-05) (74 - 77)  BP: 134/89 (06-05) (120/92 - 134/89)  RR: 17 (06-05)  SpO2: 99% (06-05)  I&O's Summary      Physical Exam:  Appearance: No acute distress; well appearing  Eyes: PERRL, EOMI, pink conjunctiva  HENT: Normal oral mucosa  Cardiovascular: RRR, S1, S2, no murmurs, rubs, or gallops; no edema; no JVD  Respiratory: Clear to auscultation bilaterally  Gastrointestinal: soft, non-tender, non-distended with normal bowel sounds  Musculoskeletal: No clubbing; no joint deformity   Neurologic: Non-focal  Lymphatic: No lymphadenopathy  Psychiatry: AAOx3, mood & affect appropriate  Skin: No rashes, ecchymoses, or cyanosis      06-04    135  |  103  |  18  ----------------------------<  168<H>  4.0   |  20<L>  |  1.23    Ca    9.2      04 Jun 2023 06:31  Phos  3.3     06-04  Mg     1.80     06-04    [facility] 3/2023  Coronary Angiography   LM   Left main artery: Angiography shows minor irregularities. There is a  20 % stenosis.    LAD   Proximal left anterior descending: There is a 50 % stenosis. Mid left  anterior descending: There is a 60 % stenosis. First diagonal: The segment is small. There is a 60 % stenosis in the ostium  portion of the segment.    CX   Proximal circumflex: There is a 50 % stenosis. First obtuse marginal:  diffuse atherosclerosis. . There is a 40 % stenosis.    RCA   Right coronary artery: The segment is moderately ectatic. Angiography  shows mild atherosclerosis.      TTE 6/4/23:  Dimensions:     Normal Values:  LA:     4.0 cm    2.0 - 4.0 cm  Ao:     2.7 cm    2.0 - 3.8 cm  SEPTUM: 0.8 cm    0.6 - 1.2 cm  PWT:    0.8cm    0.6 - 1.1 cm  LVIDd:  5.1 cm    3.0 - 5.6 cm  LVIDs:    ---     1.8 - 4.0 cm  Derived Variables:  LVMI: 66 g/m2  RWT: 0.31  Ejection Fraction (Modified Smith Rule): 36 %  ------------------------------------------------------------------------  OBSERVATIONS:  Mitral Valve: Normal mitral valve. Mild mitral  regurgitation.  Aortic Root: Normal aortic root.  Aortic Valve: Normal trileaflet aortic valve.  Left Atrium: Mildly dilated left atrium.  LA volume index =  38 cc/m2.  Left Ventricle: Moderate to severe global  left ventricular  systolic dysfunction. Normal left ventricular internal  dimensions and wall thicknesses. Mild diastolic dysfunction  (Stage I).  Right Heart: Normal right atrium. Normal right ventricular  size with decreased right ventricular systolic function.  Normal tricuspid valve. Normal pulmonic valve.  Pericardium/PleuraNormal pericardium with no pericardial  effusion.  ------------------------------------------------------------------------  CONCLUSIONS:  1. Mildly dilated left atrium.  LA volume index = 38 cc/m2.  2. Normal left ventricular internal dimensions and wall  thicknesses.  3. Moderate to severe global  left ventricular systolic  dysfunction.  4. Normal right ventricular size with decreased right  ventricular systolic function.

## 2023-07-13 NOTE — PROGRESS NOTE ADULT - ASSESSMENT
Called pt detailed message left of below and asked to call our office back     Is the top BP reading the most recent or the 1 st check and the other 3 were afterwards?     Sx, how severe? Vague sx or highly symptomatic   For stage 2 left pcnl in the am   Orders:  NPO after midnight  IVF  Consent to be obtained  Clearance as outpt   Type & Screen in am   prbc on hold

## 2023-07-31 ENCOUNTER — INPATIENT (INPATIENT)
Facility: HOSPITAL | Age: 63
LOS: 2 days | Discharge: HOME HEALTH | End: 2023-08-03
Attending: STUDENT IN AN ORGANIZED HEALTH CARE EDUCATION/TRAINING PROGRAM | Admitting: STUDENT IN AN ORGANIZED HEALTH CARE EDUCATION/TRAINING PROGRAM
Payer: COMMERCIAL

## 2023-07-31 VITALS
RESPIRATION RATE: 18 BRPM | SYSTOLIC BLOOD PRESSURE: 106 MMHG | DIASTOLIC BLOOD PRESSURE: 77 MMHG | OXYGEN SATURATION: 100 % | TEMPERATURE: 98 F | HEART RATE: 84 BPM

## 2023-07-31 DIAGNOSIS — Z98.890 OTHER SPECIFIED POSTPROCEDURAL STATES: Chronic | ICD-10-CM

## 2023-07-31 PROCEDURE — 99285 EMERGENCY DEPT VISIT HI MDM: CPT

## 2023-07-31 NOTE — ED ADULT TRIAGE NOTE - CHIEF COMPLAINT QUOTE
Patient c/o N/V/D, right-sided abdominal pain and decreased PO intake x 1 week. Denies fevers, chills, chest pain and SOB. Hx. HTN and DM.

## 2023-08-01 DIAGNOSIS — K35.32 ACUTE APPENDICITIS WITH PERFORATION, LOCALIZED PERITONITIS, AND GANGRENE, WITHOUT ABSCESS: ICD-10-CM

## 2023-08-01 PROBLEM — I42.8 OTHER CARDIOMYOPATHIES: Chronic | Status: ACTIVE | Noted: 2023-06-02

## 2023-08-01 PROBLEM — I10 ESSENTIAL (PRIMARY) HYPERTENSION: Chronic | Status: ACTIVE | Noted: 2023-06-02

## 2023-08-01 LAB
ALBUMIN SERPL ELPH-MCNC: 3.9 G/DL — SIGNIFICANT CHANGE UP (ref 3.3–5)
ALP SERPL-CCNC: 92 U/L — SIGNIFICANT CHANGE UP (ref 40–120)
ALT FLD-CCNC: 15 U/L — SIGNIFICANT CHANGE UP (ref 4–41)
ANION GAP SERPL CALC-SCNC: 17 MMOL/L — HIGH (ref 7–14)
APPEARANCE UR: CLEAR — SIGNIFICANT CHANGE UP
APTT BLD: 31.9 SEC — SIGNIFICANT CHANGE UP (ref 24.5–35.6)
AST SERPL-CCNC: 12 U/L — SIGNIFICANT CHANGE UP (ref 4–40)
BACTERIA # UR AUTO: NEGATIVE /HPF — SIGNIFICANT CHANGE UP
BASE EXCESS BLDV CALC-SCNC: -1.9 MMOL/L — SIGNIFICANT CHANGE UP (ref -2–3)
BASOPHILS # BLD AUTO: 0.04 K/UL — SIGNIFICANT CHANGE UP (ref 0–0.2)
BASOPHILS NFR BLD AUTO: 0.3 % — SIGNIFICANT CHANGE UP (ref 0–2)
BILIRUB SERPL-MCNC: 1 MG/DL — SIGNIFICANT CHANGE UP (ref 0.2–1.2)
BILIRUB UR-MCNC: NEGATIVE — SIGNIFICANT CHANGE UP
BLD GP AB SCN SERPL QL: NEGATIVE — SIGNIFICANT CHANGE UP
BLOOD GAS VENOUS COMPREHENSIVE RESULT: SIGNIFICANT CHANGE UP
BUN SERPL-MCNC: 44 MG/DL — HIGH (ref 7–23)
CALCIUM SERPL-MCNC: 9.2 MG/DL — SIGNIFICANT CHANGE UP (ref 8.4–10.5)
CAST: 2 /LPF — SIGNIFICANT CHANGE UP (ref 0–4)
CHLORIDE BLDV-SCNC: 101 MMOL/L — SIGNIFICANT CHANGE UP (ref 96–108)
CHLORIDE SERPL-SCNC: 99 MMOL/L — SIGNIFICANT CHANGE UP (ref 98–107)
CO2 BLDV-SCNC: 25.7 MMOL/L — SIGNIFICANT CHANGE UP (ref 22–26)
CO2 SERPL-SCNC: 19 MMOL/L — LOW (ref 22–31)
COLOR SPEC: YELLOW — SIGNIFICANT CHANGE UP
CREAT SERPL-MCNC: 1.91 MG/DL — HIGH (ref 0.5–1.3)
DIFF PNL FLD: NEGATIVE — SIGNIFICANT CHANGE UP
EGFR: 39 ML/MIN/1.73M2 — LOW
EOSINOPHIL # BLD AUTO: 0.13 K/UL — SIGNIFICANT CHANGE UP (ref 0–0.5)
EOSINOPHIL NFR BLD AUTO: 1.1 % — SIGNIFICANT CHANGE UP (ref 0–6)
GAS PNL BLDV: 133 MMOL/L — LOW (ref 136–145)
GLUCOSE BLDC GLUCOMTR-MCNC: 182 MG/DL — HIGH (ref 70–99)
GLUCOSE BLDC GLUCOMTR-MCNC: 186 MG/DL — HIGH (ref 70–99)
GLUCOSE BLDC GLUCOMTR-MCNC: 207 MG/DL — HIGH (ref 70–99)
GLUCOSE BLDC GLUCOMTR-MCNC: 214 MG/DL — HIGH (ref 70–99)
GLUCOSE BLDV-MCNC: 234 MG/DL — HIGH (ref 70–99)
GLUCOSE SERPL-MCNC: 225 MG/DL — HIGH (ref 70–99)
GLUCOSE UR QL: NEGATIVE MG/DL — SIGNIFICANT CHANGE UP
HCO3 BLDV-SCNC: 24 MMOL/L — SIGNIFICANT CHANGE UP (ref 22–29)
HCT VFR BLD CALC: 39.3 % — SIGNIFICANT CHANGE UP (ref 39–50)
HCT VFR BLDA CALC: 41 % — SIGNIFICANT CHANGE UP (ref 39–51)
HGB BLD CALC-MCNC: 13.7 G/DL — SIGNIFICANT CHANGE UP (ref 12.6–17.4)
HGB BLD-MCNC: 13.1 G/DL — SIGNIFICANT CHANGE UP (ref 13–17)
IANC: 9.51 K/UL — HIGH (ref 1.8–7.4)
IMM GRANULOCYTES NFR BLD AUTO: 0.5 % — SIGNIFICANT CHANGE UP (ref 0–0.9)
INR BLD: 1.27 RATIO — HIGH (ref 0.85–1.18)
KETONES UR-MCNC: NEGATIVE MG/DL — SIGNIFICANT CHANGE UP
LACTATE BLDV-MCNC: 1.8 MMOL/L — SIGNIFICANT CHANGE UP (ref 0.5–2)
LEUKOCYTE ESTERASE UR-ACNC: ABNORMAL
LIDOCAIN IGE QN: 23 U/L — SIGNIFICANT CHANGE UP (ref 7–60)
LYMPHOCYTES # BLD AUTO: 1.4 K/UL — SIGNIFICANT CHANGE UP (ref 1–3.3)
LYMPHOCYTES # BLD AUTO: 11.6 % — LOW (ref 13–44)
MCHC RBC-ENTMCNC: 28.9 PG — SIGNIFICANT CHANGE UP (ref 27–34)
MCHC RBC-ENTMCNC: 33.3 GM/DL — SIGNIFICANT CHANGE UP (ref 32–36)
MCV RBC AUTO: 86.6 FL — SIGNIFICANT CHANGE UP (ref 80–100)
MONOCYTES # BLD AUTO: 0.91 K/UL — HIGH (ref 0–0.9)
MONOCYTES NFR BLD AUTO: 7.6 % — SIGNIFICANT CHANGE UP (ref 2–14)
NEUTROPHILS # BLD AUTO: 9.51 K/UL — HIGH (ref 1.8–7.4)
NEUTROPHILS NFR BLD AUTO: 78.9 % — HIGH (ref 43–77)
NITRITE UR-MCNC: NEGATIVE — SIGNIFICANT CHANGE UP
NRBC # BLD: 0 /100 WBCS — SIGNIFICANT CHANGE UP (ref 0–0)
NRBC # FLD: 0 K/UL — SIGNIFICANT CHANGE UP (ref 0–0)
PCO2 BLDV: 46 MMHG — SIGNIFICANT CHANGE UP (ref 42–55)
PH BLDV: 7.33 — SIGNIFICANT CHANGE UP (ref 7.32–7.43)
PH UR: 5.5 — SIGNIFICANT CHANGE UP (ref 5–8)
PLATELET # BLD AUTO: 392 K/UL — SIGNIFICANT CHANGE UP (ref 150–400)
PO2 BLDV: <20 MMHG — LOW (ref 25–45)
POTASSIUM BLDV-SCNC: 4.3 MMOL/L — SIGNIFICANT CHANGE UP (ref 3.5–5.1)
POTASSIUM SERPL-MCNC: 4.2 MMOL/L — SIGNIFICANT CHANGE UP (ref 3.5–5.3)
POTASSIUM SERPL-SCNC: 4.2 MMOL/L — SIGNIFICANT CHANGE UP (ref 3.5–5.3)
PROT SERPL-MCNC: 8 G/DL — SIGNIFICANT CHANGE UP (ref 6–8.3)
PROT UR-MCNC: SIGNIFICANT CHANGE UP MG/DL
PROTHROM AB SERPL-ACNC: 14.3 SEC — HIGH (ref 9.5–13)
RBC # BLD: 4.54 M/UL — SIGNIFICANT CHANGE UP (ref 4.2–5.8)
RBC # FLD: 14.3 % — SIGNIFICANT CHANGE UP (ref 10.3–14.5)
RBC CASTS # UR COMP ASSIST: 0 /HPF — SIGNIFICANT CHANGE UP (ref 0–4)
RH IG SCN BLD-IMP: POSITIVE — SIGNIFICANT CHANGE UP
SAO2 % BLDV: 15.4 % — LOW (ref 67–88)
SODIUM SERPL-SCNC: 135 MMOL/L — SIGNIFICANT CHANGE UP (ref 135–145)
SP GR SPEC: 1.02 — SIGNIFICANT CHANGE UP (ref 1–1.03)
SQUAMOUS # UR AUTO: 2 /HPF — SIGNIFICANT CHANGE UP (ref 0–5)
UROBILINOGEN FLD QL: 1 MG/DL — SIGNIFICANT CHANGE UP (ref 0.2–1)
WBC # BLD: 12.05 K/UL — HIGH (ref 3.8–10.5)
WBC # FLD AUTO: 12.05 K/UL — HIGH (ref 3.8–10.5)
WBC UR QL: 23 /HPF — HIGH (ref 0–5)

## 2023-08-01 PROCEDURE — 74177 CT ABD & PELVIS W/CONTRAST: CPT | Mod: 26,ME

## 2023-08-01 PROCEDURE — G1004: CPT

## 2023-08-01 PROCEDURE — 99222 1ST HOSP IP/OBS MODERATE 55: CPT | Mod: GC

## 2023-08-01 RX ORDER — DEXTROSE MONOHYDRATE, SODIUM CHLORIDE, AND POTASSIUM CHLORIDE 50; .745; 4.5 G/1000ML; G/1000ML; G/1000ML
1000 INJECTION, SOLUTION INTRAVENOUS
Refills: 0 | Status: DISCONTINUED | OUTPATIENT
Start: 2023-08-01 | End: 2023-08-02

## 2023-08-01 RX ORDER — DEXTROSE 50 % IN WATER 50 %
12.5 SYRINGE (ML) INTRAVENOUS ONCE
Refills: 0 | Status: DISCONTINUED | OUTPATIENT
Start: 2023-08-01 | End: 2023-08-02

## 2023-08-01 RX ORDER — DEXTROSE 50 % IN WATER 50 %
25 SYRINGE (ML) INTRAVENOUS ONCE
Refills: 0 | Status: DISCONTINUED | OUTPATIENT
Start: 2023-08-01 | End: 2023-08-02

## 2023-08-01 RX ORDER — MORPHINE SULFATE 50 MG/1
4 CAPSULE, EXTENDED RELEASE ORAL ONCE
Refills: 0 | Status: DISCONTINUED | OUTPATIENT
Start: 2023-08-01 | End: 2023-08-01

## 2023-08-01 RX ORDER — DEXTROSE 50 % IN WATER 50 %
15 SYRINGE (ML) INTRAVENOUS ONCE
Refills: 0 | Status: DISCONTINUED | OUTPATIENT
Start: 2023-08-01 | End: 2023-08-01

## 2023-08-01 RX ORDER — METOPROLOL TARTRATE 50 MG
5 TABLET ORAL EVERY 6 HOURS
Refills: 0 | Status: DISCONTINUED | OUTPATIENT
Start: 2023-08-01 | End: 2023-08-03

## 2023-08-01 RX ORDER — PIPERACILLIN AND TAZOBACTAM 4; .5 G/20ML; G/20ML
3.38 INJECTION, POWDER, LYOPHILIZED, FOR SOLUTION INTRAVENOUS ONCE
Refills: 0 | Status: COMPLETED | OUTPATIENT
Start: 2023-08-01 | End: 2023-08-01

## 2023-08-01 RX ORDER — SODIUM CHLORIDE 9 MG/ML
1000 INJECTION INTRAMUSCULAR; INTRAVENOUS; SUBCUTANEOUS ONCE
Refills: 0 | Status: COMPLETED | OUTPATIENT
Start: 2023-08-01 | End: 2023-08-01

## 2023-08-01 RX ORDER — HYDROMORPHONE HYDROCHLORIDE 2 MG/ML
0.5 INJECTION INTRAMUSCULAR; INTRAVENOUS; SUBCUTANEOUS EVERY 4 HOURS
Refills: 0 | Status: DISCONTINUED | OUTPATIENT
Start: 2023-08-01 | End: 2023-08-02

## 2023-08-01 RX ORDER — ATORVASTATIN CALCIUM 80 MG/1
40 TABLET, FILM COATED ORAL AT BEDTIME
Refills: 0 | Status: DISCONTINUED | OUTPATIENT
Start: 2023-08-01 | End: 2023-08-03

## 2023-08-01 RX ORDER — SODIUM CHLORIDE 9 MG/ML
1000 INJECTION, SOLUTION INTRAVENOUS
Refills: 0 | Status: DISCONTINUED | OUTPATIENT
Start: 2023-08-01 | End: 2023-08-01

## 2023-08-01 RX ORDER — PIPERACILLIN AND TAZOBACTAM 4; .5 G/20ML; G/20ML
3.38 INJECTION, POWDER, LYOPHILIZED, FOR SOLUTION INTRAVENOUS EVERY 8 HOURS
Refills: 0 | Status: DISCONTINUED | OUTPATIENT
Start: 2023-08-01 | End: 2023-08-03

## 2023-08-01 RX ORDER — GLUCAGON INJECTION, SOLUTION 0.5 MG/.1ML
1 INJECTION, SOLUTION SUBCUTANEOUS ONCE
Refills: 0 | Status: DISCONTINUED | OUTPATIENT
Start: 2023-08-01 | End: 2023-08-01

## 2023-08-01 RX ORDER — ONDANSETRON 8 MG/1
4 TABLET, FILM COATED ORAL ONCE
Refills: 0 | Status: COMPLETED | OUTPATIENT
Start: 2023-08-01 | End: 2023-08-01

## 2023-08-01 RX ORDER — INSULIN LISPRO 100/ML
VIAL (ML) SUBCUTANEOUS EVERY 6 HOURS
Refills: 0 | Status: DISCONTINUED | OUTPATIENT
Start: 2023-08-01 | End: 2023-08-02

## 2023-08-01 RX ORDER — ACETAMINOPHEN 500 MG
1000 TABLET ORAL EVERY 6 HOURS
Refills: 0 | Status: COMPLETED | OUTPATIENT
Start: 2023-08-01 | End: 2023-08-01

## 2023-08-01 RX ORDER — DEXTROSE 50 % IN WATER 50 %
25 SYRINGE (ML) INTRAVENOUS ONCE
Refills: 0 | Status: DISCONTINUED | OUTPATIENT
Start: 2023-08-01 | End: 2023-08-01

## 2023-08-01 RX ORDER — HYDROMORPHONE HYDROCHLORIDE 2 MG/ML
0.25 INJECTION INTRAMUSCULAR; INTRAVENOUS; SUBCUTANEOUS EVERY 4 HOURS
Refills: 0 | Status: DISCONTINUED | OUTPATIENT
Start: 2023-08-01 | End: 2023-08-02

## 2023-08-01 RX ORDER — HEPARIN SODIUM 5000 [USP'U]/ML
5000 INJECTION INTRAVENOUS; SUBCUTANEOUS EVERY 8 HOURS
Refills: 0 | Status: COMPLETED | OUTPATIENT
Start: 2023-08-01 | End: 2023-08-01

## 2023-08-01 RX ORDER — HYDROMORPHONE HYDROCHLORIDE 2 MG/ML
0.25 INJECTION INTRAMUSCULAR; INTRAVENOUS; SUBCUTANEOUS EVERY 4 HOURS
Refills: 0 | Status: DISCONTINUED | OUTPATIENT
Start: 2023-08-01 | End: 2023-08-01

## 2023-08-01 RX ADMIN — PIPERACILLIN AND TAZOBACTAM 200 GRAM(S): 4; .5 INJECTION, POWDER, LYOPHILIZED, FOR SOLUTION INTRAVENOUS at 04:47

## 2023-08-01 RX ADMIN — SODIUM CHLORIDE 100 MILLILITER(S): 9 INJECTION, SOLUTION INTRAVENOUS at 05:34

## 2023-08-01 RX ADMIN — Medication 2: at 06:09

## 2023-08-01 RX ADMIN — Medication 400 MILLIGRAM(S): at 17:53

## 2023-08-01 RX ADMIN — Medication 2: at 12:07

## 2023-08-01 RX ADMIN — Medication 1000 MILLIGRAM(S): at 12:30

## 2023-08-01 RX ADMIN — Medication 1: at 17:54

## 2023-08-01 RX ADMIN — SODIUM CHLORIDE 1000 MILLILITER(S): 9 INJECTION INTRAMUSCULAR; INTRAVENOUS; SUBCUTANEOUS at 01:09

## 2023-08-01 RX ADMIN — PIPERACILLIN AND TAZOBACTAM 25 GRAM(S): 4; .5 INJECTION, POWDER, LYOPHILIZED, FOR SOLUTION INTRAVENOUS at 13:05

## 2023-08-01 RX ADMIN — Medication 400 MILLIGRAM(S): at 23:34

## 2023-08-01 RX ADMIN — Medication 1: at 23:34

## 2023-08-01 RX ADMIN — Medication 1000 MILLIGRAM(S): at 18:23

## 2023-08-01 RX ADMIN — Medication 400 MILLIGRAM(S): at 11:36

## 2023-08-01 RX ADMIN — HYDROMORPHONE HYDROCHLORIDE 0.5 MILLIGRAM(S): 2 INJECTION INTRAMUSCULAR; INTRAVENOUS; SUBCUTANEOUS at 05:34

## 2023-08-01 RX ADMIN — Medication 400 MILLIGRAM(S): at 05:34

## 2023-08-01 RX ADMIN — MORPHINE SULFATE 4 MILLIGRAM(S): 50 CAPSULE, EXTENDED RELEASE ORAL at 01:09

## 2023-08-01 RX ADMIN — ONDANSETRON 4 MILLIGRAM(S): 8 TABLET, FILM COATED ORAL at 01:09

## 2023-08-01 RX ADMIN — Medication 5 MILLIGRAM(S): at 11:36

## 2023-08-01 RX ADMIN — Medication 5 MILLIGRAM(S): at 17:54

## 2023-08-01 RX ADMIN — ATORVASTATIN CALCIUM 40 MILLIGRAM(S): 80 TABLET, FILM COATED ORAL at 22:08

## 2023-08-01 RX ADMIN — MORPHINE SULFATE 4 MILLIGRAM(S): 50 CAPSULE, EXTENDED RELEASE ORAL at 06:00

## 2023-08-01 RX ADMIN — PIPERACILLIN AND TAZOBACTAM 25 GRAM(S): 4; .5 INJECTION, POWDER, LYOPHILIZED, FOR SOLUTION INTRAVENOUS at 22:08

## 2023-08-01 RX ADMIN — HEPARIN SODIUM 5000 UNIT(S): 5000 INJECTION INTRAVENOUS; SUBCUTANEOUS at 18:35

## 2023-08-01 RX ADMIN — Medication 5 MILLIGRAM(S): at 23:34

## 2023-08-01 NOTE — H&P ADULT - ASSESSMENT
63-year-old male past medical  of CVA with no residual deficits, HTN, HLD, T2DM, NICM (EF 32%), CAD, on Eliquis 5mg BID per his cardiologist presents today with 1 week of nausea,vomiting and abd pain, found to have perforated appendicitis with a 4.8cm abscess.    Plan:  - Admit to B team Surgery under Dr. Ray  - NPO, IVF, IV Abx   - pain control prn  - IR consult for drainage of abscess  - Hold eliquis and valsartan  - Hold DVT ppx       d/w Dr. Ray    B team surgery  [id number]

## 2023-08-01 NOTE — H&P ADULT - ATTENDING COMMENTS
I have reviewed the history, pertinent labs and imaging, and discussed the care with the consult resident.  More than 50% of this 55 minute encounter including face to face with the patient was spent counseling and/or coordination of care on acute appendicitis with abscess.  Time included review of vitals, labs, imaging, discussion with consultants and coordination with the operating room/emergency department.    64yo M presenting with 1 week abdominal pain. Locally tender in RLQ. CT with perforated appendicitis with abscess. Likely drainable by IR.     - NPO  - IR consult for abscess drainage   - antibiotics     The active issues are:  1. perforated appendicitis with abscess     The Acute Care Surgery (B Team) Attending Group Practice:  Dr. Amarilis Ray    urgent issues - spectra [phone]  nonurgent issues - [phone]  patient appointments or afterhours - [phone]

## 2023-08-01 NOTE — CHART NOTE - NSCHARTNOTEFT_GEN_A_CORE
Pre-Interventional Radiology Procedure Note    63y Male    Procedure: abdominal drainage     Diagnosis/Indication: Patient is a 63y old  Male who presents with a chief complaint of abd pain, found to have perforated appendicitis  Interventional Radiology Attending Physician:   Ordering Attending Physician: Dr. Ray    PAST MEDICAL & SURGICAL HISTORY:  DM (diabetes mellitus)  type 2  Smoker  for 45 years, down to 1/2 a pack daily  Cerebrovascular accident (CVA)  2009 - no residuals  Eczema  left arm  Benign essential HTN  NICM (nonischemic cardiomyopathy)  S/P repair of pectoralis muscle tear  due to lift weighting 2012        CBC Full  -  ( 01 Aug 2023 01:10 )  WBC Count : 12.05 K/uL  RBC Count : 4.54 M/uL  Hemoglobin : 13.1 g/dL  Hematocrit : 39.3 %  Platelet Count - Automated : 392 K/uL  Mean Cell Volume : 86.6 fL  Mean Cell Hemoglobin : 28.9 pg  Mean Cell Hemoglobin Concentration : 33.3 gm/dL  Auto Neutrophil # : 9.51 K/uL  Auto Lymphocyte # : 1.40 K/uL  Auto Monocyte # : 0.91 K/uL  Auto Eosinophil # : 0.13 K/uL  Auto Basophil # : 0.04 K/uL  Auto Neutrophil % : 78.9 %  Auto Lymphocyte % : 11.6 %  Auto Monocyte % : 7.6 %  Auto Eosinophil % : 1.1 %  Auto Basophil % : 0.3 %    08-01    135  |  99  |  44<H>  ----------------------------<  225<H>  4.2   |  19<L>  |  1.91<H>    Ca    9.2      01 Aug 2023 01:10    TPro  8.0  /  Alb  3.9  /  TBili  1.0  /  DBili  x   /  AST  12  /  ALT  15  /  AlkPhos  92  08-01    PT/INR - ( 01 Aug 2023 04:30 )   PT: 14.3 sec;   INR: 1.27 ratio    PTT - ( 01 Aug 2023 04:30 )  PTT:31.9 sec Pre-Interventional Radiology Procedure Note    63y Male    Procedure: abdominal drainage     Diagnosis/Indication: Patient is a 63y old  Male who presents with a chief complaint of abd pain, found to have perforated appendicitis  Interventional Radiology Attending Physician: Stephen  Ordering Attending Physician: Dr. Ray    PAST MEDICAL & SURGICAL HISTORY:  DM (diabetes mellitus)  type 2  Smoker  for 45 years, down to 1/2 a pack daily  Cerebrovascular accident (CVA)  2009 - no residuals  Eczema  left arm  Benign essential HTN  NICM (nonischemic cardiomyopathy)  S/P repair of pectoralis muscle tear  due to lift weighting 2012        CBC Full  -  ( 01 Aug 2023 01:10 )  WBC Count : 12.05 K/uL  RBC Count : 4.54 M/uL  Hemoglobin : 13.1 g/dL  Hematocrit : 39.3 %  Platelet Count - Automated : 392 K/uL  Mean Cell Volume : 86.6 fL  Mean Cell Hemoglobin : 28.9 pg  Mean Cell Hemoglobin Concentration : 33.3 gm/dL  Auto Neutrophil # : 9.51 K/uL  Auto Lymphocyte # : 1.40 K/uL  Auto Monocyte # : 0.91 K/uL  Auto Eosinophil # : 0.13 K/uL  Auto Basophil # : 0.04 K/uL  Auto Neutrophil % : 78.9 %  Auto Lymphocyte % : 11.6 %  Auto Monocyte % : 7.6 %  Auto Eosinophil % : 1.1 %  Auto Basophil % : 0.3 %    08-01    135  |  99  |  44<H>  ----------------------------<  225<H>  4.2   |  19<L>  |  1.91<H>    Ca    9.2      01 Aug 2023 01:10    TPro  8.0  /  Alb  3.9  /  TBili  1.0  /  DBili  x   /  AST  12  /  ALT  15  /  AlkPhos  92  08-01    PT/INR - ( 01 Aug 2023 04:30 )   PT: 14.3 sec;   INR: 1.27 ratio    PTT - ( 01 Aug 2023 04:30 )  PTT:31.9 sec

## 2023-08-01 NOTE — ED ADULT NURSE REASSESSMENT NOTE - NS ED NURSE REASSESS COMMENT FT1
Pt Aox4 reports relief of pain from pain medications. Medicated as ordered, IVF hanging as ordered. instructed to call for assistance as needed. Report to ESSU2 RN.

## 2023-08-01 NOTE — ED ADULT NURSE NOTE - NSFALLUNIVINTERV_ED_ALL_ED
Bed/Stretcher in lowest position, wheels locked, appropriate side rails in place/Call bell, personal items and telephone in reach/Instruct patient to call for assistance before getting out of bed/chair/stretcher/Non-slip footwear applied when patient is off stretcher/[place] to call system/Physically safe environment - no spills, clutter or unnecessary equipment/Purposeful proactive rounding/Room/bathroom lighting operational, light cord in reach

## 2023-08-01 NOTE — CONSULT NOTE ADULT - ASSESSMENT
Interventional Radiology    Evaluate for Procedure: Abdominal drain    HPI: 63-year-old male past medical of CVA with no residual deficits, HTN, HLD, T2DM, NICM (EF 32%), CAD, on Eliquis 5mg BID with CT showing perforated appendicitis with [id number] adjacent fluid collection for which IR was asked to drain.     Allergies: No Known Allergies    Medications (Abx/Cardiac/Anticoagulation/Blood Products)    piperacillin/tazobactam IVPB...: 200 mL/Hr IV Intermittent (08-01 @ 04:47)    Data:    T(C): 36.3  HR: 79  BP: 104/69  RR: 16  SpO2: 100%    -WBC 12.05 / HgB 13.1 / Hct 39.3 / Plt 392  -Na 135 / Cl 99 / BUN 44 / Glucose 225  -K 4.2 / CO2 19 / Cr 1.91  -ALT 15 / Alk Phos 92 / T.Bili 1.0  -INR 1.27 / PTT 31.9      Radiology: Reviewed    Assessment/Plan:   63-year-old male past medical of CVA with no residual deficits, HTN, HLD, T2DM, NICM (EF 32%), CAD, on Eliquis 5mg BID with CT showing perforated appendicitis with [id number] adjacent fluid collection for which IR was asked to drain. Patient's last dose of Eliquis was 7/31PM. Given this is a high risk procedure, Eliquis needs to be held at least 48 hours.     -- IR will plan to perform an abscess drainage on 8/2/ 23  -- NPO at midnight on 8/1/23  -- Continue to hold anticoagulation   -- please place IR procedure request order under Dr. Mitchell + write pre-procedure note  -- STAT labs in the am:  cbc, bmp, mg, phos, coags, type+screen x 2  -- Informed consent pending      --  Jaja Jamison, PGY-3  Vascular and Interventional Radiology   Available on [employer] Teams    - Non-emergent consults: Place IR consult order in [place]  - Emergent issues (pager): [facility] [phone]; [facility] [phone]; [phone]  - Scheduling questions: [facility] [phone]; [facility] [phone]  - Clinic/outpatient booking: [facility] [phone]; [facility] [phone]

## 2023-08-01 NOTE — H&P ADULT - NSHPLABSRESULTS_GEN_ALL_CORE
13.1   12.05 )-----------( 392      ( 01 Aug 2023 01:10 )             39.3   08-01    135  |  99  |  44<H>  ----------------------------<  225<H>  4.2   |  19<L>  |  1.91<H>    Ca    9.2      01 Aug 2023 01:10    TPro  8.0  /  Alb  3.9  /  TBili  1.0  /  DBili  x   /  AST  12  /  ALT  15  /  AlkPhos  92  08-01  < from: CT Abdomen and Pelvis w/ IV Cont (08.01.23 @ 03:04) >    FINDINGS:  LOWER CHEST: Within normal limits.    LIVER: Within normal limits.  BILE DUCTS: Normal caliber.  GALLBLADDER: Within normal limits.  SPLEEN: Within normal limits.  PANCREAS: Within normal limits.  ADRENALS: Within normal limits.  KIDNEYS/URETERS: No hydronephrosis bilaterally. Nonobstructing   right-sided staghorn calculus measuring up to 3.2 cm. Bilateral renal   cysts. Cortical volume loss worse on the left.    BLADDER: Within normal limits.  REPRODUCTIVE ORGANS: Prostate is enlarged.    BOWEL: Colonic diverticula. No bowel obstruction. Inflamed appendix   becomes progressively dilated as it extends medially and posteriorly off   the cecal apex, perforating with a right lower quadrant abscess measuring   4.3 x 4.8 x 4.5cm inferomedial to the cecum. Adjacent inflammation. No   evidence of extra luminal air.  PERITONEUM: No ascites.  VESSELS: Atherosclerotic changes. Small infrarenal abdominal aortic   aneurysm measures 3.1 cm diameter.  RETROPERITONEUM/LYMPH NODES: No lymphadenopathy.  ABDOMINAL WALL: Within normal limits.  BONES: Degenerative changes.    IMPRESSION:  Perforated appendicitis with 4.8 cm abscess inferomedial to the cecum.    No other acute finding.    Large nonobstructing right renal stones, bilateral renal cortical volume   loss worse on the left.    Enlarged prostate.    3.1 cm infrarenal abdominal aortic aneurysm.    --- End of Report ---    ***Please see the addendum at the top of this report. It may contain   additional important information or changes.****      JUSTINE KENNY DO; Resident Radiologist  This document has been electronically signed.  BETSY MORRIS MD; Attending Radiologist  This document has been electronically signed. Aug  1 2023  4:12AM  1st Addendum: BETSY MORRIS MD; Attending Radiologist  The first addendum was electronically signed on: Aug  1 2023  4:21AM.    < end of copied text >

## 2023-08-01 NOTE — ED PROVIDER NOTE - PHYSICAL EXAMINATION
Vitals: I have reviewed the patients vital signs  General: nontoxic appearing  HEENT: Atraumatic, normocephalic, airway patent  Eyes: EOMI, tracking appropriately  Neck: no tracheal deviation  Chest/Lungs: no trauma, symmetric chest rise, speaking in complete sentences,  no resp distress  Heart: skin and extremities well perfused, regular rate and rhythm  Neuro: A+Ox3, appears non focal  MSK: no deformities  Skin: no cyanosis, no jaundice   Psych:  Normal mood and affect  Abdomen: Soft tender to palpation right lower quadrant with voluntary guarding and no rebound

## 2023-08-01 NOTE — ED ADULT NURSE NOTE - NSFALLRISKFACTORS_ED_ALL_ED
Additional Comments: A new bandage was applied to the graft site. Vaseline, Mastisol, nonstick dressing and tape. Patient is to leave the bandage in place for seven days and clean with soap and water. He is also to reapply a new bandage for an additional week using vaseline, nonstick pad and tape. \\nPatient may begin daily bandages of the donor site using vaseline, nonstick pad and tape. He should continue daily dressing changes until fully healed and there is no more crusting along the wound. \\nPatient was also advised begin to apply sunscreen daily to the graft site when he is no longer bandaging the graft site. \\nPatient will follow up as needed with any concerns.
Wound Evaluated By: Dr. Surya Verdugo
Detail Level: Detailed
Add 61370 Cpt? (Important Note: In 2017 The Use Of 54563 Is Being Tracked By Cms To Determine Future Global Period Reimbursement For Global Periods): yes
No indicators present

## 2023-08-01 NOTE — ED PROVIDER NOTE - OBJECTIVE STATEMENT
63-year-old male past medical history of diabetes and hypertension presents emerged from today with 1 week of nausea vomiting diarrhea.  The nausea is nonbloody nonbilious diarrhea is nonbloody as well and very watery.  This occurs with all p.o. intake.  Does state he is able to tolerate his medications.  Associated with a 10 pound weight loss.  There is also right lower quadrant abdominal pain that is described as severe and sharp.  It does not radiate.  No history of similar pain.  No dysuria.

## 2023-08-01 NOTE — PATIENT PROFILE ADULT - FALL HARM RISK - UNIVERSAL INTERVENTIONS
Bed in lowest position, wheels locked, appropriate side rails in place/Call bell, personal items and telephone in reach/Instruct patient to call for assistance before getting out of bed or chair/Non-slip footwear when patient is out of bed/[place] to call system/Physically safe environment - no spills, clutter or unnecessary equipment/Purposeful Proactive Rounding/Room/bathroom lighting operational, light cord in reach

## 2023-08-01 NOTE — H&P ADULT - HISTORY OF PRESENT ILLNESS
63-year-old male past medical  of CVA with no residual deficits, HTN, HLD, T2DM, NICM (EF 32%), CAD, on Eliquis 5mg BID per his cardiologist presents today with 1 week of nausea,vomiting and abd pain. REports no fevers or chills.     In the ED, patient was afebrile, hemodynamically stable and nontoxic appearing.

## 2023-08-01 NOTE — H&P ADULT - NSHPPHYSICALEXAM_GEN_ALL_CORE
Gen: NAD, appears comfortable  Resp: airway intact, nonlabored  Abd: soft, focally tender in RLQ, nondistended   Vasc: WWP  Neuro: A&Ox3

## 2023-08-01 NOTE — ED ADULT NURSE NOTE - OBJECTIVE STATEMENT
Pt arrives to room 4 A&Ox4, ambulatory, on room air coming to ED c/o N/V/D. Pt history of HTN, CVA, DM. Pt endorses diarrhea began x 1 week ago, nonbloody, brown watery stool. Pt endorses he has been unable to tolerate PO medications due to frequent diarrhea. Pt denies chest pain, HA, SOB, dizziness, vision changes, fever/chills. Respirations even and unlabored, chest rise and fall equal b/l. Abdomen soft and nondistended. Urine yellow, clear, no odor. Left 20g placed, labs collected and sent. Urine collected and sent. Pt pending CT. /67, per MD matthew crenshaw to give morphine. Safety maintained.

## 2023-08-02 ENCOUNTER — TRANSCRIPTION ENCOUNTER (OUTPATIENT)
Age: 63
End: 2023-08-02

## 2023-08-02 LAB
ANION GAP SERPL CALC-SCNC: 14 MMOL/L — SIGNIFICANT CHANGE UP (ref 7–14)
APTT BLD: 30.6 SEC — SIGNIFICANT CHANGE UP (ref 24.5–35.6)
BLD GP AB SCN SERPL QL: NEGATIVE — SIGNIFICANT CHANGE UP
BUN SERPL-MCNC: 32 MG/DL — HIGH (ref 7–23)
CALCIUM SERPL-MCNC: 8.6 MG/DL — SIGNIFICANT CHANGE UP (ref 8.4–10.5)
CHLORIDE SERPL-SCNC: 103 MMOL/L — SIGNIFICANT CHANGE UP (ref 98–107)
CO2 SERPL-SCNC: 20 MMOL/L — LOW (ref 22–31)
CREAT SERPL-MCNC: 1.78 MG/DL — HIGH (ref 0.5–1.3)
EGFR: 42 ML/MIN/1.73M2 — LOW
GLUCOSE BLDC GLUCOMTR-MCNC: 180 MG/DL — HIGH (ref 70–99)
GLUCOSE BLDC GLUCOMTR-MCNC: 201 MG/DL — HIGH (ref 70–99)
GLUCOSE BLDC GLUCOMTR-MCNC: 253 MG/DL — HIGH (ref 70–99)
GLUCOSE SERPL-MCNC: 193 MG/DL — HIGH (ref 70–99)
GRAM STN FLD: SIGNIFICANT CHANGE UP
HCT VFR BLD CALC: 32.5 % — LOW (ref 39–50)
HCT VFR BLD CALC: 33.8 % — LOW (ref 39–50)
HGB BLD-MCNC: 11 G/DL — LOW (ref 13–17)
HGB BLD-MCNC: 11.5 G/DL — LOW (ref 13–17)
INR BLD: 1.2 RATIO — HIGH (ref 0.85–1.18)
MAGNESIUM SERPL-MCNC: 2.1 MG/DL — SIGNIFICANT CHANGE UP (ref 1.6–2.6)
MCHC RBC-ENTMCNC: 28.2 PG — SIGNIFICANT CHANGE UP (ref 27–34)
MCHC RBC-ENTMCNC: 29 PG — SIGNIFICANT CHANGE UP (ref 27–34)
MCHC RBC-ENTMCNC: 33.8 GM/DL — SIGNIFICANT CHANGE UP (ref 32–36)
MCHC RBC-ENTMCNC: 34 GM/DL — SIGNIFICANT CHANGE UP (ref 32–36)
MCV RBC AUTO: 83.3 FL — SIGNIFICANT CHANGE UP (ref 80–100)
MCV RBC AUTO: 85.1 FL — SIGNIFICANT CHANGE UP (ref 80–100)
NRBC # BLD: 0 /100 WBCS — SIGNIFICANT CHANGE UP (ref 0–0)
NRBC # BLD: 0 /100 WBCS — SIGNIFICANT CHANGE UP (ref 0–0)
NRBC # FLD: 0 K/UL — SIGNIFICANT CHANGE UP (ref 0–0)
NRBC # FLD: 0 K/UL — SIGNIFICANT CHANGE UP (ref 0–0)
PHOSPHATE SERPL-MCNC: 3.4 MG/DL — SIGNIFICANT CHANGE UP (ref 2.5–4.5)
PLATELET # BLD AUTO: 375 K/UL — SIGNIFICANT CHANGE UP (ref 150–400)
PLATELET # BLD AUTO: 375 K/UL — SIGNIFICANT CHANGE UP (ref 150–400)
POTASSIUM SERPL-MCNC: 4 MMOL/L — SIGNIFICANT CHANGE UP (ref 3.5–5.3)
POTASSIUM SERPL-SCNC: 4 MMOL/L — SIGNIFICANT CHANGE UP (ref 3.5–5.3)
PROTHROM AB SERPL-ACNC: 13.4 SEC — HIGH (ref 9.5–13)
RBC # BLD: 3.9 M/UL — LOW (ref 4.2–5.8)
RBC # BLD: 3.97 M/UL — LOW (ref 4.2–5.8)
RBC # FLD: 14 % — SIGNIFICANT CHANGE UP (ref 10.3–14.5)
RBC # FLD: 14.4 % — SIGNIFICANT CHANGE UP (ref 10.3–14.5)
RH IG SCN BLD-IMP: POSITIVE — SIGNIFICANT CHANGE UP
SODIUM SERPL-SCNC: 137 MMOL/L — SIGNIFICANT CHANGE UP (ref 135–145)
SPECIMEN SOURCE: SIGNIFICANT CHANGE UP
WBC # BLD: 11.76 K/UL — HIGH (ref 3.8–10.5)
WBC # BLD: 12.06 K/UL — HIGH (ref 3.8–10.5)
WBC # FLD AUTO: 11.76 K/UL — HIGH (ref 3.8–10.5)
WBC # FLD AUTO: 12.06 K/UL — HIGH (ref 3.8–10.5)

## 2023-08-02 PROCEDURE — 99232 SBSQ HOSP IP/OBS MODERATE 35: CPT

## 2023-08-02 PROCEDURE — 49406 IMAGE CATH FLUID PERI/RETRO: CPT

## 2023-08-02 RX ORDER — INSULIN GLARGINE 100 [IU]/ML
22 INJECTION, SOLUTION SUBCUTANEOUS EVERY MORNING
Refills: 0 | Status: DISCONTINUED | OUTPATIENT
Start: 2023-08-02 | End: 2023-08-02

## 2023-08-02 RX ORDER — OXYCODONE HYDROCHLORIDE 5 MG/1
5 TABLET ORAL EVERY 4 HOURS
Refills: 0 | Status: DISCONTINUED | OUTPATIENT
Start: 2023-08-02 | End: 2023-08-03

## 2023-08-02 RX ORDER — OXYCODONE HYDROCHLORIDE 5 MG/1
2.5 TABLET ORAL EVERY 4 HOURS
Refills: 0 | Status: DISCONTINUED | OUTPATIENT
Start: 2023-08-02 | End: 2023-08-03

## 2023-08-02 RX ORDER — INSULIN LISPRO 100/ML
7 VIAL (ML) SUBCUTANEOUS
Refills: 0 | Status: DISCONTINUED | OUTPATIENT
Start: 2023-08-02 | End: 2023-08-03

## 2023-08-02 RX ORDER — INSULIN LISPRO 100/ML
8 VIAL (ML) SUBCUTANEOUS
Refills: 0 | Status: DISCONTINUED | OUTPATIENT
Start: 2023-08-02 | End: 2023-08-03

## 2023-08-02 RX ORDER — INSULIN LISPRO 100/ML
VIAL (ML) SUBCUTANEOUS
Refills: 0 | Status: DISCONTINUED | OUTPATIENT
Start: 2023-08-02 | End: 2023-08-03

## 2023-08-02 RX ORDER — HEPARIN SODIUM 5000 [USP'U]/ML
5000 INJECTION INTRAVENOUS; SUBCUTANEOUS EVERY 8 HOURS
Refills: 0 | Status: DISCONTINUED | OUTPATIENT
Start: 2023-08-02 | End: 2023-08-03

## 2023-08-02 RX ORDER — GLUCAGON INJECTION, SOLUTION 0.5 MG/.1ML
1 INJECTION, SOLUTION SUBCUTANEOUS ONCE
Refills: 0 | Status: DISCONTINUED | OUTPATIENT
Start: 2023-08-02 | End: 2023-08-03

## 2023-08-02 RX ORDER — DEXTROSE 50 % IN WATER 50 %
15 SYRINGE (ML) INTRAVENOUS ONCE
Refills: 0 | Status: DISCONTINUED | OUTPATIENT
Start: 2023-08-02 | End: 2023-08-03

## 2023-08-02 RX ORDER — INSULIN LISPRO 100/ML
VIAL (ML) SUBCUTANEOUS EVERY 6 HOURS
Refills: 0 | Status: DISCONTINUED | OUTPATIENT
Start: 2023-08-02 | End: 2023-08-02

## 2023-08-02 RX ORDER — DEXTROSE 50 % IN WATER 50 %
25 SYRINGE (ML) INTRAVENOUS ONCE
Refills: 0 | Status: DISCONTINUED | OUTPATIENT
Start: 2023-08-02 | End: 2023-08-03

## 2023-08-02 RX ORDER — DEXTROSE 50 % IN WATER 50 %
12.5 SYRINGE (ML) INTRAVENOUS ONCE
Refills: 0 | Status: DISCONTINUED | OUTPATIENT
Start: 2023-08-02 | End: 2023-08-03

## 2023-08-02 RX ORDER — ACETAMINOPHEN 500 MG
975 TABLET ORAL EVERY 6 HOURS
Refills: 0 | Status: DISCONTINUED | OUTPATIENT
Start: 2023-08-02 | End: 2023-08-03

## 2023-08-02 RX ORDER — SODIUM CHLORIDE 9 MG/ML
1000 INJECTION, SOLUTION INTRAVENOUS
Refills: 0 | Status: DISCONTINUED | OUTPATIENT
Start: 2023-08-02 | End: 2023-08-03

## 2023-08-02 RX ORDER — INSULIN GLARGINE 100 [IU]/ML
22 INJECTION, SOLUTION SUBCUTANEOUS AT BEDTIME
Refills: 0 | Status: DISCONTINUED | OUTPATIENT
Start: 2023-08-02 | End: 2023-08-03

## 2023-08-02 RX ADMIN — PIPERACILLIN AND TAZOBACTAM 25 GRAM(S): 4; .5 INJECTION, POWDER, LYOPHILIZED, FOR SOLUTION INTRAVENOUS at 05:12

## 2023-08-02 RX ADMIN — HYDROMORPHONE HYDROCHLORIDE 0.5 MILLIGRAM(S): 2 INJECTION INTRAMUSCULAR; INTRAVENOUS; SUBCUTANEOUS at 10:01

## 2023-08-02 RX ADMIN — Medication 2: at 12:28

## 2023-08-02 RX ADMIN — Medication 3: at 23:47

## 2023-08-02 RX ADMIN — Medication 5 MILLIGRAM(S): at 23:46

## 2023-08-02 RX ADMIN — HYDROMORPHONE HYDROCHLORIDE 0.5 MILLIGRAM(S): 2 INJECTION INTRAMUSCULAR; INTRAVENOUS; SUBCUTANEOUS at 19:44

## 2023-08-02 RX ADMIN — HEPARIN SODIUM 5000 UNIT(S): 5000 INJECTION INTRAVENOUS; SUBCUTANEOUS at 22:01

## 2023-08-02 RX ADMIN — PIPERACILLIN AND TAZOBACTAM 25 GRAM(S): 4; .5 INJECTION, POWDER, LYOPHILIZED, FOR SOLUTION INTRAVENOUS at 14:48

## 2023-08-02 RX ADMIN — Medication 1: at 05:11

## 2023-08-02 RX ADMIN — Medication 1000 MILLIGRAM(S): at 00:04

## 2023-08-02 RX ADMIN — ATORVASTATIN CALCIUM 40 MILLIGRAM(S): 80 TABLET, FILM COATED ORAL at 22:01

## 2023-08-02 RX ADMIN — HYDROMORPHONE HYDROCHLORIDE 0.5 MILLIGRAM(S): 2 INJECTION INTRAMUSCULAR; INTRAVENOUS; SUBCUTANEOUS at 10:31

## 2023-08-02 RX ADMIN — PIPERACILLIN AND TAZOBACTAM 25 GRAM(S): 4; .5 INJECTION, POWDER, LYOPHILIZED, FOR SOLUTION INTRAVENOUS at 22:01

## 2023-08-02 RX ADMIN — HYDROMORPHONE HYDROCHLORIDE 0.5 MILLIGRAM(S): 2 INJECTION INTRAMUSCULAR; INTRAVENOUS; SUBCUTANEOUS at 20:14

## 2023-08-02 RX ADMIN — Medication 5 MILLIGRAM(S): at 05:12

## 2023-08-02 NOTE — PROGRESS NOTE ADULT - ATTENDING COMMENTS
Perforated appendicitis with abscess  a.  Remain afebrile, Fullness in RLQ  b  Trend WBC  C.  continue iv ZOSN  d.  for IR drainage    At riskf for malnutrition  a,  NPO at this time  b.  Advance s/p procedure

## 2023-08-02 NOTE — PROCEDURE NOTE - PLAN
- monitor drain output: strict outputs  - 5cc NS forward flush only daily, do not aspirate  - monitor for signs of bleeding or sepsis  - may advance diet and resume preprocedure orders per primary team  - VIR will follow

## 2023-08-02 NOTE — DISCHARGE NOTE PROVIDER - NSDCFUADDINST_GEN_ALL_CORE_FT
WOUND CARE:  Please keep incisions clean and dry. Please do not Scrub or rub incisions. Do not use lotion or powder on incisions.   BATHING: You may shower and/or sponge bathe. You may use warm soapy water in the shower and rinse, pat dry.   PAIN: You may take over-the-counter medications for pain. You make take Tylenol orally every 6 hours as needed. Do not excessed 4,000mg a day.   MEDICATIONS: Please continue home medications as prescribed. Please any new medications as prescribed on your pill bottle. If you have any questions, please call your surgeon's office.   ACTIVITY: No heavy lifting or straining. Otherwise, you may return to your usual level of physical activity.   DIET: Return to your usual diet.  DRAIN: You will be discharged with a drain.  You will need to empty it and record outputs accurately.  This was taught to you by the nursing staff prior to discharge from the hospital.  Please do not remove the drain.  It will be removed in the office at your follow up appointment.  Please bring accurate records of output to the office at your follow up appointment.    NOTIFY YOUR SURGEON IF YOU HAVE: any bleeding that does not stop, any pus draining from your wound(s), any fever (over 100.4 F) persistent nausea/vomiting, inability to urinate, or if your pain is not controlled on your discharge pain medications.     FOLLOW UP:  1. Please follow up with your primary care physician in one week regarding your hospitalization, bring copies of your discharge paperwork.  2. Please follow up with your surgeon, Dr. Ray 1-2 weeks after discharge. Please call the office to schedule the appointment or if you have any questions.  3. Please follow up with IR as discussed above.

## 2023-08-02 NOTE — PRE PROCEDURE NOTE - PRE PROCEDURE EVALUATION
Interventional Radiology    HPI:  63-year-old male past medical  of CVA with no residual deficits, HTN, HLD, T2DM, NICM (EF 32%), CAD, on Eliquis 5mg BID per his cardiologist with perforated appendicitis with RLQ fluid collection, presents to IR for drainage.  .      Allergies: No Known Allergies    Medications (Abx/Cardiac/Anticoagulation/Blood Products)  heparin   Injectable: 5000 Unit(s) SubCutaneous (08-01 @ 18:35)  metoprolol tartrate Injectable: 5 milliGRAM(s) IV Push (08-02 @ 05:12)  piperacillin/tazobactam IVPB..: 25 mL/Hr IV Intermittent (08-02 @ 14:48)  piperacillin/tazobactam IVPB...: 200 mL/Hr IV Intermittent (08-01 @ 04:47)    Home Medications  atorvastatin 40 mg oral tablet: 1 tab(s) orally once a day  Eliquis 5 mg oral tablet: 1 tab(s) orally 2 times a day  metFORMIN 500 mg oral tablet: 1 tab(s) orally 2 times a day.   Metoprolol Succinate ER 50 mg oral tablet, extended release: 1 tab(s) orally once a day  Protonix 40 mg oral delayed release tablet: 1 tab(s) orally once a day (before a meal)  valsartan 160 mg oral tablet: 1 tab(s) orally once a day      -WBC 12.06 / HgB 11.0 / Hct 32.5 / Plt 375  -Na 137 / Cl 103 / BUN 32 / Glucose 193  -K 4.0 / CO2 20 / Cr 1.78  -ALT -- / Alk Phos -- / T.Bili --  -INR1.20      -Risks/Benefits/alternatives explained with the patient and/or healthcare proxy and witnessed informed consent obtained.

## 2023-08-02 NOTE — DISCHARGE NOTE PROVIDER - NSDCFUADDAPPT_GEN_ALL_CORE_FT
Please follow up with Interventional radiology to have your drain evaluated one week from discharge.  Please call today, to schedule an appointment (for one week from discharge date) [phone].   Location is in [facility] on the second floor radiology Room 263. You can park at [facility] parking garage. Continue to empty and record the drain output daily as you have been taught.

## 2023-08-02 NOTE — DISCHARGE NOTE PROVIDER - CARE PROVIDER_API CALL
Amarilis Ray [facility]  Surgery  [address] [place] [address] Level [place] [address]  Phone: [phone]  Fax: [phone]  Follow Up Time: 2 weeks

## 2023-08-02 NOTE — DISCHARGE NOTE PROVIDER - NSDCMRMEDTOKEN_GEN_ALL_CORE_FT
atorvastatin 40 mg oral tablet: 1 tab(s) orally once a day  Eliquis 5 mg oral tablet: 1 tab(s) orally 2 times a day  metFORMIN 500 mg oral tablet: 1 tab(s) orally 2 times a day.   Metoprolol Succinate ER 50 mg oral tablet, extended release: 1 tab(s) orally once a day  Protonix 40 mg oral delayed release tablet: 1 tab(s) orally once a day (before a meal)  valsartan 160 mg oral tablet: 1 tab(s) orally once a day   acetaminophen 325 mg oral tablet: 3 tab(s) orally every 6 hours As needed Mild Pain (1 - 3), Moderate Pain (4 - 6)  amoxicillin-clavulanate 875 mg-125 mg oral tablet: 1 tab(s) orally every 12 hours  atorvastatin 40 mg oral tablet: 1 tab(s) orally once a day  Eliquis 5 mg oral tablet: 1 tab(s) orally 2 times a day  metFORMIN 500 mg oral tablet: 1 tab(s) orally 2 times a day.   Metoprolol Succinate ER 50 mg oral tablet, extended release: 1 tab(s) orally once a day  Protonix 40 mg oral delayed release tablet: 1 tab(s) orally once a day (before a meal)  valsartan 160 mg oral tablet: 1 tab(s) orally once a day

## 2023-08-02 NOTE — CHART NOTE - NSCHARTNOTEFT_GEN_A_CORE
Post Operative Note  Patient: YAJAIRA SANTIAGO 63y (1960) Male   Date: 08-02-23 @ 23:15    Patient seen five hours after IR abscess drainage performed by Dr. Leblanc on 8/2/2023.     Subjective: Patient seen and examined post operatively. Reports pain well-controlled, denies nausea, vomiting, fever, chills, chest pain, SOB.    Objective:  Vitals: T(F): 98.4 (08-02-23 @ 22:00), Max: 98.5 (08-02-23 @ 01:55)  HR: 95 (08-02-23 @ 22:00)  BP: 125/90 (08-02-23 @ 22:00) (100/72 - 125/90)  RR: 18 (08-02-23 @ 22:00)  SpO2: 98% (08-02-23 @ 22:00)    Physical Examination:  General: NAD, resting comfortably in bed.  Respiratory: Equal bilateral chest expansion, no increased WOB.  Cardiovascular: RRR/WWP.   Abdomen: Soft NTND, IR drain in place, no oozing/blood/rash around drain site.     Assessment:  63M seen five hours after IR placement of RLQ abscess drainage. 60cc pus drained, drain left in place to bulb suction by IR. Tolerated procedure well, now recovering appropriately on surgical floor.     Plan:  - AC/DVT: SQH.  - Antimicrobial: Zosyn.  - Diet: Regular/consistent carb.   - Pain: Tylenol/oxycodone.   - Wound care: Will need drain teaching prior to discharge.   - Dispo: Surgical floor, dc pending clinical course.     Date/Time: 08-02-23 @ 23:15

## 2023-08-02 NOTE — DISCHARGE NOTE PROVIDER - HOSPITAL COURSE
Patient is a 62y/o male with PMHx CVA (no residual deficits), HTN, HLD, T2DM, EF (32%), CAD (on Eliquis 5mg BID) who presented with [facility] with abdominal pain, n/v.     CT on admission showed:  Perforated appendicitis with 4.8 cm abscess inferomedial to the cecum. No other acute findings.   Large nonobstructing right renal stones, bilateral renal cortical volume loss worse on the left.  Enlarged prostate.  3.1 cm infrarenal abdominal aortic aneurysm.    Patient admitted to surgical service for management. Patient made NPO and started on IVF/IV antibiotics. IR was consulted for possible intervention. Eliquis was held 48hr prior to procedure.     Patient is now s/p IR drainage on 8/2/23. Patient tolerated procedure well and transferred back to surgical floor.   ****************incomplete       Patient received drain teaching prior to discharge. Patient felt comfortable with managing prior to discharge.     At this time, patient is currently ambulating, voiding, tolerating a regular diet. Pain well controlled on PO pain meds. Patient is hemodynamically stable and felt safe with being discharged. Patient understood and agreed with plan. Per Dr. Ray, patient is stable for discharge to home with outpatient follow up within 1-2 weeks of discharge. Patient is a 62y/o male with PMHx CVA (no residual deficits), HTN, HLD, T2DM, EF (32%), CAD (on Eliquis 5mg BID) who presented with [facility] with abdominal pain, n/v.     CT on admission showed:  Perforated appendicitis with 4.8 cm abscess inferomedial to the cecum. No other acute findings.   Large nonobstructing right renal stones, bilateral renal cortical volume loss worse on the left.  Enlarged prostate.  3.1 cm infrarenal abdominal aortic aneurysm.    Patient admitted to surgical service for management. Patient made NPO and started on IVF/IV antibiotics. IR was consulted for possible intervention. Eliquis was held 48hr prior to procedure.     Patient is now s/p IR drainage on 8/2/23. Patient tolerated procedure well and transferred back to surgical floor.     Patient received drain teaching prior to discharge. Patient felt comfortable with managing prior to discharge.     At this time, patient is currently ambulating, voiding, tolerating a regular diet. Pain well controlled on PO pain meds. Patient is hemodynamically stable and felt safe with being discharged. Patient understood and agreed with plan. Per Dr. Ray, patient is stable for discharge to home with outpatient follow up within 1-2 weeks of discharge.

## 2023-08-03 ENCOUNTER — TRANSCRIPTION ENCOUNTER (OUTPATIENT)
Age: 63
End: 2023-08-03

## 2023-08-03 VITALS
DIASTOLIC BLOOD PRESSURE: 82 MMHG | SYSTOLIC BLOOD PRESSURE: 122 MMHG | OXYGEN SATURATION: 97 % | RESPIRATION RATE: 17 BRPM | HEART RATE: 90 BPM | TEMPERATURE: 99 F

## 2023-08-03 LAB
ANION GAP SERPL CALC-SCNC: 13 MMOL/L — SIGNIFICANT CHANGE UP (ref 7–14)
BUN SERPL-MCNC: 20 MG/DL — SIGNIFICANT CHANGE UP (ref 7–23)
CALCIUM SERPL-MCNC: 9 MG/DL — SIGNIFICANT CHANGE UP (ref 8.4–10.5)
CHLORIDE SERPL-SCNC: 105 MMOL/L — SIGNIFICANT CHANGE UP (ref 98–107)
CO2 SERPL-SCNC: 21 MMOL/L — LOW (ref 22–31)
CREAT SERPL-MCNC: 1.75 MG/DL — HIGH (ref 0.5–1.3)
EGFR: 43 ML/MIN/1.73M2 — LOW
GLUCOSE BLDC GLUCOMTR-MCNC: 142 MG/DL — HIGH (ref 70–99)
GLUCOSE BLDC GLUCOMTR-MCNC: 196 MG/DL — HIGH (ref 70–99)
GLUCOSE BLDC GLUCOMTR-MCNC: 212 MG/DL — HIGH (ref 70–99)
GLUCOSE SERPL-MCNC: 175 MG/DL — HIGH (ref 70–99)
HCT VFR BLD CALC: 35.7 % — LOW (ref 39–50)
HGB BLD-MCNC: 11.6 G/DL — LOW (ref 13–17)
MAGNESIUM SERPL-MCNC: 2.2 MG/DL — SIGNIFICANT CHANGE UP (ref 1.6–2.6)
MCHC RBC-ENTMCNC: 28.5 PG — SIGNIFICANT CHANGE UP (ref 27–34)
MCHC RBC-ENTMCNC: 32.5 GM/DL — SIGNIFICANT CHANGE UP (ref 32–36)
MCV RBC AUTO: 87.7 FL — SIGNIFICANT CHANGE UP (ref 80–100)
NRBC # BLD: 0 /100 WBCS — SIGNIFICANT CHANGE UP (ref 0–0)
NRBC # FLD: 0 K/UL — SIGNIFICANT CHANGE UP (ref 0–0)
PHOSPHATE SERPL-MCNC: 3.5 MG/DL — SIGNIFICANT CHANGE UP (ref 2.5–4.5)
PLATELET # BLD AUTO: 435 K/UL — HIGH (ref 150–400)
POTASSIUM SERPL-MCNC: 4.3 MMOL/L — SIGNIFICANT CHANGE UP (ref 3.5–5.3)
POTASSIUM SERPL-SCNC: 4.3 MMOL/L — SIGNIFICANT CHANGE UP (ref 3.5–5.3)
RBC # BLD: 4.07 M/UL — LOW (ref 4.2–5.8)
RBC # FLD: 14.6 % — HIGH (ref 10.3–14.5)
SODIUM SERPL-SCNC: 139 MMOL/L — SIGNIFICANT CHANGE UP (ref 135–145)
WBC # BLD: 9.93 K/UL — SIGNIFICANT CHANGE UP (ref 3.8–10.5)
WBC # FLD AUTO: 9.93 K/UL — SIGNIFICANT CHANGE UP (ref 3.8–10.5)

## 2023-08-03 PROCEDURE — 99231 SBSQ HOSP IP/OBS SF/LOW 25: CPT

## 2023-08-03 RX ORDER — VALSARTAN 80 MG/1
160 TABLET ORAL DAILY
Refills: 0 | Status: DISCONTINUED | OUTPATIENT
Start: 2023-08-03 | End: 2023-08-03

## 2023-08-03 RX ORDER — METOPROLOL TARTRATE 50 MG
50 TABLET ORAL DAILY
Refills: 0 | Status: DISCONTINUED | OUTPATIENT
Start: 2023-08-03 | End: 2023-08-03

## 2023-08-03 RX ORDER — ACETAMINOPHEN 500 MG
3 TABLET ORAL
Qty: 0 | Refills: 0 | DISCHARGE
Start: 2023-08-03

## 2023-08-03 RX ADMIN — Medication 8 UNIT(S): at 12:58

## 2023-08-03 RX ADMIN — OXYCODONE HYDROCHLORIDE 5 MILLIGRAM(S): 5 TABLET ORAL at 07:39

## 2023-08-03 RX ADMIN — HEPARIN SODIUM 5000 UNIT(S): 5000 INJECTION INTRAVENOUS; SUBCUTANEOUS at 05:50

## 2023-08-03 RX ADMIN — OXYCODONE HYDROCHLORIDE 5 MILLIGRAM(S): 5 TABLET ORAL at 07:09

## 2023-08-03 RX ADMIN — OXYCODONE HYDROCHLORIDE 5 MILLIGRAM(S): 5 TABLET ORAL at 00:17

## 2023-08-03 RX ADMIN — OXYCODONE HYDROCHLORIDE 5 MILLIGRAM(S): 5 TABLET ORAL at 00:47

## 2023-08-03 RX ADMIN — PIPERACILLIN AND TAZOBACTAM 25 GRAM(S): 4; .5 INJECTION, POWDER, LYOPHILIZED, FOR SOLUTION INTRAVENOUS at 05:51

## 2023-08-03 RX ADMIN — Medication 2: at 17:58

## 2023-08-03 RX ADMIN — Medication 7 UNIT(S): at 17:58

## 2023-08-03 RX ADMIN — Medication 1 TABLET(S): at 17:59

## 2023-08-03 RX ADMIN — Medication 7 UNIT(S): at 09:09

## 2023-08-03 RX ADMIN — Medication 5 MILLIGRAM(S): at 05:50

## 2023-08-03 RX ADMIN — Medication 1: at 09:09

## 2023-08-03 NOTE — DISCHARGE NOTE NURSING/CASE MANAGEMENT/SOCIAL WORK - PATIENT PORTAL LINK FT
You can access the FollowMyHealth Patient Portal offered by [facility] by registering at the following website: [email or web address]. By joining [facility]s FollowMyHealth portal, you will also be able to view your health information using other applications (apps) compatible with our system.

## 2023-08-03 NOTE — PROGRESS NOTE ADULT - ASSESSMENT
63y Male with perforated appendicitis with RLQ fluid collection s/p drain placement on 8/2 in Interventional Radiology.     Plan:  - Continue drainage, monitor output  - Can be discharged home with drain if outputs remain high  - Flush drain 5cc NS qd  - Will need noncontrast CT + IR tube check once outputs < 10cc/24hr, can be arranged as outpatient follow-up. Outpatient IR office [phone]    [phone]

## 2023-08-03 NOTE — PROVIDER CONTACT NOTE (CRITICAL VALUE NOTIFICATION) - ASSESSMENT
patient had critical lab value of few polymorphonuclear leukocytes, gram negative rods, and few gram positive cocci in pairs per oil power field

## 2023-08-03 NOTE — DISCHARGE NOTE NURSING/CASE MANAGEMENT/SOCIAL WORK - NSSCNAMETXT_GEN_ALL_CORE
[facility] [phone] Nurse to visit on the day following discharge. Other appropriate services to be arranged thereafter. Please contact the home care agency at the above phone number if you have not heard from them by approximately 12 noon on the day after your hospital discharge.

## 2023-08-03 NOTE — PROVIDER CONTACT NOTE (CRITICAL VALUE NOTIFICATION) - TEST AND RESULT REPORTED:
few polymorphonuclear leukocytes, gram negative rods, and few gram positive cocci in pairs per oil power field

## 2023-08-03 NOTE — PROGRESS NOTE ADULT - ASSESSMENT
63 year old male with PMH CVA with no residual deficits, HTN, HLD, T2DM, NICM (EF 32%), CAD on Eliquis 5mg BID presents with perforated appendicitis s/p IR drainage on 8/2    Plan  - Diet: regular  - C/w augmentin  - Pain control  - Flush IR drain 5 cc daily  - DVT prophylaxis    B team surgery [id number] 63 year old male with PMH CVA with no residual deficits, HTN, HLD, T2DM, NICM (EF 32%), CAD on Eliquis 5mg BID presents with perforated appendicitis s/p IR drainage on 8/2    Plan  - Diet: regular  - C/w augmentin  - Lopressor 5mg q6 hours, can switch to PO home dose  - Pain control  - Flush IR drain 5 cc daily  - DVT prophylaxis    B team surgery [id number]

## 2023-08-03 NOTE — PROGRESS NOTE ADULT - SUBJECTIVE AND OBJECTIVE BOX
TEAM [ B ] Surgery Daily Progress Note  ===================================================    SUBJECTIVE: Patient seen and examined at bedside on AM rounds. No acute overnight events. Patient reports that they're feeling well, still having abdominal pain. Reports Flatus. OOB/Amublating as tolerated. Denies nausea, vomiting, fever, chills, SOB, chest pain.     PAST MEDICAL & SURGICAL HISTORY:  DM (diabetes mellitus)  type 2  Smoker  for 45 years, down to 1/2 a pack daily  Cerebrovascular accident (CVA)  2009 - no residuals  Eczema  left arm  Benign essential HTN  NICM (nonischemic cardiomyopathy)  S/P repair of pectoralis muscle tear      ALLERGIES:  No Known Allergies      --------------------------------------------------------------------------------------    MEDICATIONS:    Neurologic Medications  HYDROmorphone  Injectable 0.5 milliGRAM(s) IV Push every 4 hours PRN Severe Pain (7 - 10)  HYDROmorphone  Injectable 0.25 milliGRAM(s) IV Push every 4 hours PRN Moderate Pain (4 - 6)    Respiratory Medications    Cardiovascular Medications  metoprolol tartrate Injectable 5 milliGRAM(s) IV Push every 6 hours    Gastrointestinal Medications  dextrose 5% + sodium chloride 0.45% with potassium chloride 20 mEq/L 1000 milliLiter(s) IV Continuous <Continuous>    Genitourinary Medications    Hematologic/Oncologic Medications    Antimicrobial/Immunologic Medications  piperacillin/tazobactam IVPB.. 3.375 Gram(s) IV Intermittent every 8 hours    Endocrine/Metabolic Medications  atorvastatin 40 milliGRAM(s) Oral at bedtime  dextrose 50% Injectable 12.5 Gram(s) IV Push once  dextrose 50% Injectable 25 Gram(s) IV Push once  insulin lispro (ADMELOG) corrective regimen sliding scale   SubCutaneous every 6 hours    Topical/Other Medications    --------------------------------------------------------------------------------------    VITAL SIGNS:  T(C): 36.8 (08-02-23 @ 09:03), Max: 36.9 (08-02-23 @ 01:55)  HR: 85 (08-02-23 @ 09:03) (80 - 91)  BP: 116/86 (08-02-23 @ 09:03) (100/72 - 123/71)  RR: 18 (08-02-23 @ 09:03) (16 - 18)  SpO2: 99% (08-02-23 @ 09:03) (98% - 100%)  --------------------------------------------------------------------------------------    EXAM  General: NAD, resting in bed comfortably. A&Ox4.   Cardiac: S1, S2. pulse present   Respiratory: Nonlabored respirations, normal cw expansion. No signs of respiratory distress.   Abdomen: soft, nondistended. RLQ tender   Extremities: no deformities, moving all extremities spontaneously.     --------------------------------------------------------------------------------------    LABS                          11.0   12.06 )-----------( 375      ( 02 Aug 2023 09:03 )             32.5     08-02    137  |  103  |  32<H>  ----------------------------<  193<H>  4.0   |  20<L>  |  1.78<H>    Ca    8.6      02 Aug 2023 04:55  Phos  3.4     08-02  Mg     2.10     08-02    TPro  8.0  /  Alb  3.9  /  TBili  1.0  /  DBili  x   /  AST  12  /  ALT  15  /  AlkPhos  92  08-01      --------------------------------------------------------------------------------------    INS AND OUTS:    08-01-23 @ 07:01  -  08-02-23 @ 07:00  --------------------------------------------------------  IN: 1700 mL / OUT: 850 mL / NET: 850 mL      --------------------------------------------------------------------------------------        
POST ANESTHESIA EVALUATION    63y Male POSTOP DAY 1      MENTAL STATUS: Patient participation [x  ] Awake     [  ] Arousable     [  ] Sedated    AIRWAY PATENCY: [x  ] Satisfactory  [  ] Other:     Vital Signs Last 24 Hrs  T(C): 36.8 (03 Aug 2023 09:47), Max: 36.9 (02 Aug 2023 22:00)  T(F): 98.2 (03 Aug 2023 09:47), Max: 98.4 (02 Aug 2023 22:00)  HR: 85 (03 Aug 2023 09:47) (82 - 95)  BP: 112/78 (03 Aug 2023 09:47) (112/78 - 131/83)  BP(mean): --  RR: 18 (03 Aug 2023 09:47) (18 - 18)  SpO2: 100% (03 Aug 2023 09:47) (98% - 100%)    Parameters below as of 03 Aug 2023 09:47  Patient On (Oxygen Delivery Method): room air      I&O's Summary    02 Aug 2023 07:01  -  03 Aug 2023 07:00  --------------------------------------------------------  IN: 1740 mL / OUT: 727.5 mL / NET: 1012.5 mL          NAUSEA/ VOMITTING:  [x  ] NONE  [  ] CONTROLLED [  ] OTHER     PAIN: [ x ] CONTROLLED WITH CURRENT REGIMEN  [  ] OTHER    [x  ] NO APPARENT ANESTHESIA COMPLICATIONS  
TEAM [ B ] Surgery Daily Progress Note  =====================================================    SUBJECTIVE: Patient seen and examined at bedside on AM rounds. Patient reports that they're feeling well. Denies fever, chills, N/V, chest pain, SOB    ALLERGIES:  No Known Allergies      --------------------------------------------------------------------------------------    MEDICATIONS:    Neurologic Medications  acetaminophen     Tablet .. 975 milliGRAM(s) Oral every 6 hours PRN Mild Pain (1 - 3), Moderate Pain (4 - 6)  oxyCODONE    IR 2.5 milliGRAM(s) Oral every 4 hours PRN Moderate Pain (4 - 6)  oxyCODONE    IR 5 milliGRAM(s) Oral every 4 hours PRN Severe Pain (7 - 10)    Respiratory Medications    Cardiovascular Medications  metoprolol tartrate Injectable 5 milliGRAM(s) IV Push every 6 hours    Gastrointestinal Medications    Genitourinary Medications    Hematologic/Oncologic Medications  heparin   Injectable 5000 Unit(s) SubCutaneous every 8 hours    Antimicrobial/Immunologic Medications  amoxicillin  875 milliGRAM(s)/clavulanate 1 Tablet(s) Oral every 12 hours    Endocrine/Metabolic Medications  atorvastatin 40 milliGRAM(s) Oral at bedtime  dextrose 50% Injectable 12.5 Gram(s) IV Push once  dextrose 50% Injectable 25 Gram(s) IV Push once  dextrose Oral Gel 15 Gram(s) Oral once PRN Blood Glucose LESS THAN 70 milliGRAM(s)/deciliter  insulin glargine Injectable (LANTUS) 22 Unit(s) SubCutaneous at bedtime  insulin lispro (ADMELOG) corrective regimen sliding scale   SubCutaneous three times a day before meals  insulin lispro Injectable (ADMELOG) 7 Unit(s) SubCutaneous before breakfast  insulin lispro Injectable (ADMELOG) 8 Unit(s) SubCutaneous before lunch  insulin lispro Injectable (ADMELOG) 7 Unit(s) SubCutaneous before dinner    Topical/Other Medications    --------------------------------------------------------------------------------------    VITAL SIGNS:  T(C): 36.8 (08-03-23 @ 09:47), Max: 36.9 (08-02-23 @ 22:00)  HR: 85 (08-03-23 @ 09:47) (76 - 95)  BP: 112/78 (08-03-23 @ 09:47) (112/78 - 131/83)  RR: 18 (08-03-23 @ 09:47) (16 - 18)  SpO2: 100% (08-03-23 @ 09:47) (98% - 100%)  --------------------------------------------------------------------------------------    INS AND OUTS:    08-02-23 @ 07:01  -  08-03-23 @ 07:00  --------------------------------------------------------  IN: 1740 mL / OUT: 727.5 mL / NET: 1012.5 mL      --------------------------------------------------------------------------------------      EXAM    General: NAD, resting in bed comfortably.  Cardiac: regular rate, warm and well perfused  Respiratory: Nonlabored respirations, normal cw expansion.  Abdomen: soft, nontender, nondistended, RLQ IR drain  Extremities: normal strength, FROM, no deformities    --------------------------------------------------------------------------------------    LABS                        11.6   9.93  )-----------( 435      ( 03 Aug 2023 06:22 )             35.7   08-03    139  |  105  |  20  ----------------------------<  175<H>  4.3   |  21<L>  |  1.75<H>    Ca    9.0      03 Aug 2023 06:22  Phos  3.5     08-03  Mg     2.20     08-03        
  63y Male s/p rlq abdominal drainage on 8/2 in Interventional Radiology.     Patient seen and examined bedside resting comfortably. No complaints offered.    T(F): 98 (08-03-23 @ 05:45), Max: 98.4 (08-02-23 @ 22:00)  HR: 84 (08-03-23 @ 05:45) (76 - 95)  BP: 121/70 (08-03-23 @ 05:45) (109/68 - 131/83)  RR: 18 (08-03-23 @ 05:45) (16 - 18)  SpO2: 100% (08-03-23 @ 05:45) (98% - 100%)  Wt(kg): --    LABS:                        11.6   9.93  )-----------( 435      ( 03 Aug 2023 06:22 )             35.7     08-03    139  |  105  |  20  ----------------------------<  175<H>  4.3   |  21<L>  |  1.75<H>    Ca    9.0      03 Aug 2023 06:22  Phos  3.5     08-03  Mg     2.20     08-03      PT/INR - ( 02 Aug 2023 04:55 )   PT: 13.4 sec;   INR: 1.20 ratio         PTT - ( 02 Aug 2023 04:55 )  PTT:30.6 sec  I&O's Detail    02 Aug 2023 07:01  -  03 Aug 2023 07:00  --------------------------------------------------------  IN:    dextrose 5% + sodium chloride 0.45% w/ Additives: 900 mL    IV PiggyBack: 100 mL    Oral Fluid: 740 mL  Total IN: 1740 mL    OUT:    Bulb (mL): 27.5 mL    Voided (mL): 700 mL  Total OUT: 727.5 mL    Total NET: 1012.5 mL            PHYSICAL EXAM:  General: Nontoxic, in NAD  RLQ drain: dressing c/d/i, flushed by primary team.

## 2023-08-03 NOTE — PROVIDER CONTACT NOTE (CRITICAL VALUE NOTIFICATION) - NAME OF MD/NP/PA/DO NOTIFIED:
Received prescription renewal request for   lamoTRIgine 200 MG last renewed on 6/29/22 for 90 day supply with 1 refills.  OLANZapine 15 MG, OLANZapine 20 MG, OLANZapine 5 MG,  Melatonin 5 MG   last renewed on 10/26/22 for 30 day supply with 0 refills.    Last visit 11/29/22  Next visit NONE    Renewing Rx for 30 day supply / 2 refill(s) per Authorized per [facility] Refill Protocol.             Svia Wheeler

## 2023-08-03 NOTE — DISCHARGE NOTE NURSING/CASE MANAGEMENT/SOCIAL WORK - NSDCFUADDAPPT_GEN_ALL_CORE_FT
Please follow up with Interventional radiology to have your drain evaluated one week from discharge.  Please call today, to schedule an appointment (for one week from discharge date) [phone].   Location is in [facility] on the second floor radiology Room 263. You can park at [facility] parking garage. Continue to empty and record the drain output daily as you have been taught.

## 2023-08-04 LAB
-  CEFTRIAXONE: SIGNIFICANT CHANGE UP
-  CLINDAMYCIN: SIGNIFICANT CHANGE UP
-  ERYTHROMYCIN: SIGNIFICANT CHANGE UP
-  LEVOFLOXACIN: SIGNIFICANT CHANGE UP
-  PENICILLIN: SIGNIFICANT CHANGE UP
-  VANCOMYCIN: SIGNIFICANT CHANGE UP
CULTURE RESULTS: SIGNIFICANT CHANGE UP
METHOD TYPE: SIGNIFICANT CHANGE UP
ORGANISM # SPEC MICROSCOPIC CNT: SIGNIFICANT CHANGE UP
ORGANISM # SPEC MICROSCOPIC CNT: SIGNIFICANT CHANGE UP
SPECIMEN SOURCE: SIGNIFICANT CHANGE UP

## 2023-08-16 ENCOUNTER — APPOINTMENT (OUTPATIENT)
Dept: CT IMAGING | Facility: HOSPITAL | Age: 63
End: 2023-08-16

## 2023-08-16 ENCOUNTER — RESULT REVIEW (OUTPATIENT)
Age: 63
End: 2023-08-16

## 2023-08-16 ENCOUNTER — OUTPATIENT (OUTPATIENT)
Dept: OUTPATIENT SERVICES | Facility: HOSPITAL | Age: 63
LOS: 1 days | End: 2023-08-16
Payer: COMMERCIAL

## 2023-08-16 VITALS
OXYGEN SATURATION: 100 % | HEART RATE: 70 BPM | SYSTOLIC BLOOD PRESSURE: 125 MMHG | RESPIRATION RATE: 16 BRPM | TEMPERATURE: 97 F | DIASTOLIC BLOOD PRESSURE: 90 MMHG

## 2023-08-16 VITALS
SYSTOLIC BLOOD PRESSURE: 126 MMHG | DIASTOLIC BLOOD PRESSURE: 86 MMHG | TEMPERATURE: 97 F | HEART RATE: 72 BPM | OXYGEN SATURATION: 100 % | RESPIRATION RATE: 16 BRPM

## 2023-08-16 DIAGNOSIS — K35.30 ACUTE APPENDICITIS WITH LOCALIZED PERITONITIS, WITHOUT PERFORATION OR GANGRENE: ICD-10-CM

## 2023-08-16 DIAGNOSIS — K65.1 PERITONEAL ABSCESS: ICD-10-CM

## 2023-08-16 DIAGNOSIS — Z98.890 OTHER SPECIFIED POSTPROCEDURAL STATES: Chronic | ICD-10-CM

## 2023-08-16 DIAGNOSIS — Z46.82 ENCOUNTER FOR FITTING AND ADJUSTMENT OF NON-VASCULAR CATHETER: ICD-10-CM

## 2023-08-16 PROCEDURE — 74150 CT ABDOMEN W/O CONTRAST: CPT | Mod: 26

## 2023-08-16 PROCEDURE — 76080 X-RAY EXAM OF FISTULA: CPT | Mod: 26

## 2023-08-16 PROCEDURE — 49424 ASSESS CYST CONTRAST INJECT: CPT

## 2023-08-16 NOTE — PROCEDURE NOTE - PROCEDURE FINDINGS AND DETAILS
CT scan and tube check revealing resolution of the previously drained abdominal collection    The drainage catheter was removed   Patient instructed to follow up with surgeon for possible appendectomy.

## 2023-08-17 ENCOUNTER — RESULT REVIEW (OUTPATIENT)
Age: 63
End: 2023-08-17

## 2023-08-31 ENCOUNTER — OUTPATIENT (OUTPATIENT)
Dept: OUTPATIENT SERVICES | Facility: HOSPITAL | Age: 63
LOS: 1 days | End: 2023-08-31

## 2023-08-31 ENCOUNTER — APPOINTMENT (OUTPATIENT)
Dept: TRAUMA SURGERY | Facility: CLINIC | Age: 63
End: 2023-08-31
Payer: COMMERCIAL

## 2023-08-31 VITALS
WEIGHT: 206 LBS | SYSTOLIC BLOOD PRESSURE: 128 MMHG | HEART RATE: 87 BPM | DIASTOLIC BLOOD PRESSURE: 90 MMHG | OXYGEN SATURATION: 99 % | BODY MASS INDEX: 27.3 KG/M2 | HEIGHT: 73 IN

## 2023-08-31 DIAGNOSIS — Z86.79 PERSONAL HISTORY OF OTHER DISEASES OF THE CIRCULATORY SYSTEM: ICD-10-CM

## 2023-08-31 DIAGNOSIS — Z98.890 OTHER SPECIFIED POSTPROCEDURAL STATES: Chronic | ICD-10-CM

## 2023-08-31 PROCEDURE — 99214 OFFICE O/P EST MOD 30 MIN: CPT

## 2023-09-02 LAB
CULTURE RESULTS: SIGNIFICANT CHANGE UP
SPECIMEN SOURCE: SIGNIFICANT CHANGE UP

## 2023-09-12 ENCOUNTER — OUTPATIENT (OUTPATIENT)
Dept: OUTPATIENT SERVICES | Facility: HOSPITAL | Age: 63
LOS: 1 days | End: 2023-09-12
Payer: COMMERCIAL

## 2023-09-12 VITALS
RESPIRATION RATE: 18 BRPM | HEART RATE: 82 BPM | DIASTOLIC BLOOD PRESSURE: 86 MMHG | HEIGHT: 72 IN | WEIGHT: 210.1 LBS | TEMPERATURE: 97 F | SYSTOLIC BLOOD PRESSURE: 120 MMHG | OXYGEN SATURATION: 98 %

## 2023-09-12 DIAGNOSIS — I10 ESSENTIAL (PRIMARY) HYPERTENSION: ICD-10-CM

## 2023-09-12 DIAGNOSIS — Z98.890 OTHER SPECIFIED POSTPROCEDURAL STATES: Chronic | ICD-10-CM

## 2023-09-12 DIAGNOSIS — K35.32 ACUTE APPENDICITIS WITH PERFORATION, LOCALIZED PERITONITIS, AND GANGRENE, WITHOUT ABSCESS: ICD-10-CM

## 2023-09-12 DIAGNOSIS — I63.9 CEREBRAL INFARCTION, UNSPECIFIED: ICD-10-CM

## 2023-09-12 DIAGNOSIS — E11.9 TYPE 2 DIABETES MELLITUS WITHOUT COMPLICATIONS: ICD-10-CM

## 2023-09-12 LAB
A1C WITH ESTIMATED AVERAGE GLUCOSE RESULT: 8.5 % — HIGH (ref 4–5.6)
ANION GAP SERPL CALC-SCNC: 13 MMOL/L — SIGNIFICANT CHANGE UP (ref 7–14)
BUN SERPL-MCNC: 20 MG/DL — SIGNIFICANT CHANGE UP (ref 7–23)
CALCIUM SERPL-MCNC: 9.4 MG/DL — SIGNIFICANT CHANGE UP (ref 8.4–10.5)
CHLORIDE SERPL-SCNC: 106 MMOL/L — SIGNIFICANT CHANGE UP (ref 98–107)
CO2 SERPL-SCNC: 20 MMOL/L — LOW (ref 22–31)
CREAT SERPL-MCNC: 1.26 MG/DL — SIGNIFICANT CHANGE UP (ref 0.5–1.3)
EGFR: 64 ML/MIN/1.73M2 — SIGNIFICANT CHANGE UP
ESTIMATED AVERAGE GLUCOSE: 197 — SIGNIFICANT CHANGE UP
GLUCOSE SERPL-MCNC: 157 MG/DL — HIGH (ref 70–99)
HCT VFR BLD CALC: 38.2 % — LOW (ref 39–50)
HGB BLD-MCNC: 12.6 G/DL — LOW (ref 13–17)
MCHC RBC-ENTMCNC: 28.5 PG — SIGNIFICANT CHANGE UP (ref 27–34)
MCHC RBC-ENTMCNC: 33 GM/DL — SIGNIFICANT CHANGE UP (ref 32–36)
MCV RBC AUTO: 86.4 FL — SIGNIFICANT CHANGE UP (ref 80–100)
NRBC # BLD: 0 /100 WBCS — SIGNIFICANT CHANGE UP (ref 0–0)
NRBC # FLD: 0 K/UL — SIGNIFICANT CHANGE UP (ref 0–0)
PLATELET # BLD AUTO: 375 K/UL — SIGNIFICANT CHANGE UP (ref 150–400)
POTASSIUM SERPL-MCNC: 4.4 MMOL/L — SIGNIFICANT CHANGE UP (ref 3.5–5.3)
POTASSIUM SERPL-SCNC: 4.4 MMOL/L — SIGNIFICANT CHANGE UP (ref 3.5–5.3)
RBC # BLD: 4.42 M/UL — SIGNIFICANT CHANGE UP (ref 4.2–5.8)
RBC # FLD: 15.3 % — HIGH (ref 10.3–14.5)
SODIUM SERPL-SCNC: 139 MMOL/L — SIGNIFICANT CHANGE UP (ref 135–145)
WBC # BLD: 6.7 K/UL — SIGNIFICANT CHANGE UP (ref 3.8–10.5)
WBC # FLD AUTO: 6.7 K/UL — SIGNIFICANT CHANGE UP (ref 3.8–10.5)

## 2023-09-12 PROCEDURE — 93010 ELECTROCARDIOGRAM REPORT: CPT

## 2023-09-12 RX ORDER — ATORVASTATIN CALCIUM 80 MG/1
1 TABLET, FILM COATED ORAL
Refills: 0 | DISCHARGE

## 2023-09-12 RX ORDER — METFORMIN HYDROCHLORIDE 850 MG/1
1 TABLET ORAL
Refills: 0 | DISCHARGE

## 2023-09-12 RX ORDER — SITAGLIPTIN 50 MG/1
1 TABLET, FILM COATED ORAL
Refills: 0 | DISCHARGE

## 2023-09-12 RX ORDER — APIXABAN 2.5 MG/1
1 TABLET, FILM COATED ORAL
Qty: 0 | Refills: 0 | DISCHARGE

## 2023-09-12 RX ORDER — APIXABAN 2.5 MG/1
1 TABLET, FILM COATED ORAL
Refills: 0 | DISCHARGE

## 2023-09-12 RX ORDER — SACUBITRIL AND VALSARTAN 24; 26 MG/1; MG/1
1 TABLET, FILM COATED ORAL
Refills: 0 | DISCHARGE

## 2023-09-12 RX ORDER — METOPROLOL TARTRATE 50 MG
1 TABLET ORAL
Refills: 0 | DISCHARGE

## 2023-09-12 RX ORDER — FLUTICASONE PROPIONATE 50 MCG
1 SPRAY, SUSPENSION NASAL
Refills: 0 | DISCHARGE

## 2023-09-12 RX ORDER — VALSARTAN 80 MG/1
1 TABLET ORAL
Qty: 0 | Refills: 0 | DISCHARGE

## 2023-09-12 NOTE — H&P PST ADULT - NEGATIVE ENDOCRINE SYMPTOMS
no cold intolerance/no heat intolerance no cold intolerance/no heat intolerance/no striae/no voice change/no hirsutism

## 2023-09-12 NOTE — H&P PST ADULT - PROBLEM SELECTOR PLAN 3
Monitor BS A.M of surgery. Pt instructed not to take any diabetes medications on the day of surgery. Pt verbalized understanding.

## 2023-09-12 NOTE — H&P PST ADULT - HISTORY OF PRESENT ILLNESS
58 yo male, PMH HTN, HLD, DM2, CVA ~ 10 years ago (no residuals), known bilateral staghorn stones for over 10 years, PCP encouraged to see urologist since they are getting larger. Pt. presents to PST for scheduled Stage I, Left Percutaneous Nephrostolithotomy 7/23/19 and 2 days later will have Right PCNL. Pt. denies any back pain, flank pain, dysuria, fever, chills, chest pain, SOB, changes in bowel habits. 62 y/o male, PMH HTN, HLD, DM2, CVA > 10 years ago (no residuals), known bilateral staghorn stones for over 10 years,     appendix ruptured a month ago,   adm to [facility]. x 4 days. 64 y/o male with Hx of HTN, HLD, DM2, CVA > 10 years ago (no residuals), presents for pre op evaluation stated that " my appendix ruptured a month ago". Pre op diagnosis: acue appendicitis with perf. Now schedule for laparoscopic appendectomy, possible open tentatively on 09/15/23

## 2023-09-12 NOTE — H&P PST ADULT - NSICDXPASTMEDICALHX_GEN_ALL_CORE_FT
PAST MEDICAL HISTORY:  Cerebrovascular accident (CVA) 2009 - no residuals    DM (diabetes mellitus) type 2    Eczema left arm    High blood pressure     Smoker for 45 years, down to 1/2 a pack daily PAST MEDICAL HISTORY:  Benign essential HTN     Cerebrovascular accident (CVA) 2009 - no residuals    DM (diabetes mellitus) type 2    Eczema left arm    NICM (nonischemic cardiomyopathy)     Smoker for 45 years, down to 1/2 a pack daily

## 2023-09-12 NOTE — H&P PST ADULT - PROBLEM SELECTOR PLAN 4
On Eliquis, left message & NP's contact number with [facility] who stated that PCP is available. Message sent to MD regarding treatment plan for Eliquis.

## 2023-09-12 NOTE — H&P PST ADULT - NSICDXPASTSURGICALHX_GEN_ALL_CORE_FT
PAST SURGICAL HISTORY:  S/P repair of pectoralis muscle tear due to lift weighting 2012 PAST SURGICAL HISTORY:  History of nephrolithotomy with removal of calculi     S/P repair of pectoralis muscle tear due to lift weighting 2012

## 2023-09-12 NOTE — H&P PST ADULT - NEGATIVE GENERAL GENITOURINARY SYMPTOMS
no hematuria/no renal colic/no flank pain L/no flank pain R/no bladder infections/no incontinence/no dysuria/normal urinary frequency/no nocturia

## 2023-09-12 NOTE — H&P PST ADULT - PROBLEM SELECTOR PLAN 1
Schedule for laparoscopic appendectomy, possible open tentatively on 09/15/23. Pre op instructions, chlorhexidine gluconate soap given and explained. Pt verbalized understanding.

## 2023-09-14 PROBLEM — Z86.79 HISTORY OF CONGESTIVE HEART FAILURE: Status: RESOLVED | Noted: 2023-09-14 | Resolved: 2023-09-14

## 2023-09-15 ENCOUNTER — APPOINTMENT (OUTPATIENT)
Dept: TRAUMA SURGERY | Facility: HOSPITAL | Age: 63
End: 2023-09-15

## 2023-10-03 NOTE — PATIENT PROFILE ADULT - DO YOU FEEL UNSAFE AT HOME, WORK, OR SCHOOL?
I spoke to the patient's wife.  She stated his breathing had in fact gotten worse after the thoracentesis we performed.  I let her know there is no evidence of cancer or infection and that the fluid is related to volume overload.  Unfortunately since he's not shown significant improvement with our interventions, I'm not sure interventional Pulmonary has more to offer unless the patient would like to reconsider intrapleural catheter.  I think this is unfortunately the sequela of his heart and renal failure.   no

## 2024-01-01 NOTE — ED PROVIDER NOTE - NSICDXPASTMEDICALHX_GEN_ALL_CORE_FT
PAST MEDICAL HISTORY:  Cerebrovascular accident (CVA) 2009 - no residuals    DM (diabetes mellitus) type 2    Eczema left arm    High blood pressure     Smoker for 45 years, down to 1/2 a pack daily     Statement Selected

## 2024-02-20 ENCOUNTER — EMERGENCY (EMERGENCY)
Facility: HOSPITAL | Age: 64
LOS: 0 days | Discharge: HOME | End: 2024-02-21
Attending: STUDENT IN AN ORGANIZED HEALTH CARE EDUCATION/TRAINING PROGRAM
Payer: COMMERCIAL

## 2024-02-20 VITALS
HEIGHT: 72 IN | TEMPERATURE: 98 F | SYSTOLIC BLOOD PRESSURE: 134 MMHG | DIASTOLIC BLOOD PRESSURE: 93 MMHG | HEART RATE: 88 BPM | RESPIRATION RATE: 18 BRPM | WEIGHT: 197.98 LBS | OXYGEN SATURATION: 99 %

## 2024-02-20 DIAGNOSIS — Z86.73 PERSONAL HISTORY OF TRANSIENT ISCHEMIC ATTACK (TIA), AND CEREBRAL INFARCTION WITHOUT RESIDUAL DEFICITS: ICD-10-CM

## 2024-02-20 DIAGNOSIS — I10 ESSENTIAL (PRIMARY) HYPERTENSION: ICD-10-CM

## 2024-02-20 DIAGNOSIS — R35.0 FREQUENCY OF MICTURITION: ICD-10-CM

## 2024-02-20 DIAGNOSIS — E78.5 HYPERLIPIDEMIA, UNSPECIFIED: ICD-10-CM

## 2024-02-20 DIAGNOSIS — Z98.890 OTHER SPECIFIED POSTPROCEDURAL STATES: Chronic | ICD-10-CM

## 2024-02-20 DIAGNOSIS — I25.10 ATHEROSCLEROTIC HEART DISEASE OF NATIVE CORONARY ARTERY WITHOUT ANGINA PECTORIS: ICD-10-CM

## 2024-02-20 DIAGNOSIS — Z79.01 LONG TERM (CURRENT) USE OF ANTICOAGULANTS: ICD-10-CM

## 2024-02-20 DIAGNOSIS — E11.65 TYPE 2 DIABETES MELLITUS WITH HYPERGLYCEMIA: ICD-10-CM

## 2024-02-20 DIAGNOSIS — R63.1 POLYDIPSIA: ICD-10-CM

## 2024-02-20 DIAGNOSIS — Z79.84 LONG TERM (CURRENT) USE OF ORAL HYPOGLYCEMIC DRUGS: ICD-10-CM

## 2024-02-20 LAB
ALBUMIN SERPL ELPH-MCNC: 3.4 G/DL — SIGNIFICANT CHANGE UP (ref 3.3–5)
ALP SERPL-CCNC: 105 U/L — SIGNIFICANT CHANGE UP (ref 40–120)
ALT FLD-CCNC: 42 U/L — SIGNIFICANT CHANGE UP (ref 12–78)
ANION GAP SERPL CALC-SCNC: 5 MMOL/L — SIGNIFICANT CHANGE UP (ref 5–17)
APPEARANCE UR: CLEAR — SIGNIFICANT CHANGE UP
AST SERPL-CCNC: 27 U/L — SIGNIFICANT CHANGE UP (ref 15–37)
BACTERIA # UR AUTO: ABNORMAL /HPF
BASE EXCESS BLDV CALC-SCNC: -2 MMOL/L — SIGNIFICANT CHANGE UP (ref -2–3)
BASOPHILS # BLD AUTO: 0.03 K/UL — SIGNIFICANT CHANGE UP (ref 0–0.2)
BASOPHILS NFR BLD AUTO: 0.4 % — SIGNIFICANT CHANGE UP (ref 0–2)
BILIRUB SERPL-MCNC: 1 MG/DL — SIGNIFICANT CHANGE UP (ref 0.2–1.2)
BILIRUB UR-MCNC: NEGATIVE — SIGNIFICANT CHANGE UP
BLOOD GAS COMMENTS, VENOUS: SIGNIFICANT CHANGE UP
BUN SERPL-MCNC: 18 MG/DL — SIGNIFICANT CHANGE UP (ref 7–23)
CALCIUM SERPL-MCNC: 9.2 MG/DL — SIGNIFICANT CHANGE UP (ref 8.5–10.1)
CHLORIDE SERPL-SCNC: 105 MMOL/L — SIGNIFICANT CHANGE UP (ref 96–108)
CO2 BLDV-SCNC: 23 MMOL/L — SIGNIFICANT CHANGE UP (ref 22–26)
CO2 SERPL-SCNC: 23 MMOL/L — SIGNIFICANT CHANGE UP (ref 22–31)
COLOR SPEC: YELLOW — SIGNIFICANT CHANGE UP
CREAT SERPL-MCNC: 1.78 MG/DL — HIGH (ref 0.5–1.3)
DIFF PNL FLD: ABNORMAL
EGFR: 42 ML/MIN/1.73M2 — LOW
EOSINOPHIL # BLD AUTO: 0.13 K/UL — SIGNIFICANT CHANGE UP (ref 0–0.5)
EOSINOPHIL NFR BLD AUTO: 1.8 % — SIGNIFICANT CHANGE UP (ref 0–6)
EPI CELLS # UR: SIGNIFICANT CHANGE UP
GAS PNL BLDV: SIGNIFICANT CHANGE UP
GLUCOSE SERPL-MCNC: 452 MG/DL — CRITICAL HIGH (ref 70–99)
GLUCOSE UR QL: 500 MG/DL
HCO3 BLDV-SCNC: 22 MMOL/L — SIGNIFICANT CHANGE UP (ref 22–28)
HCT VFR BLD CALC: 39.1 % — SIGNIFICANT CHANGE UP (ref 39–50)
HGB BLD-MCNC: 13.5 G/DL — SIGNIFICANT CHANGE UP (ref 13–17)
IMM GRANULOCYTES NFR BLD AUTO: 0.4 % — SIGNIFICANT CHANGE UP (ref 0–0.9)
KETONES UR-MCNC: NEGATIVE MG/DL — SIGNIFICANT CHANGE UP
LEUKOCYTE ESTERASE UR-ACNC: NEGATIVE — SIGNIFICANT CHANGE UP
LYMPHOCYTES # BLD AUTO: 1.59 K/UL — SIGNIFICANT CHANGE UP (ref 1–3.3)
LYMPHOCYTES # BLD AUTO: 22.6 % — SIGNIFICANT CHANGE UP (ref 13–44)
MCHC RBC-ENTMCNC: 28.4 PG — SIGNIFICANT CHANGE UP (ref 27–34)
MCHC RBC-ENTMCNC: 34.5 G/DL — SIGNIFICANT CHANGE UP (ref 32–36)
MCV RBC AUTO: 82.3 FL — SIGNIFICANT CHANGE UP (ref 80–100)
MONOCYTES # BLD AUTO: 0.47 K/UL — SIGNIFICANT CHANGE UP (ref 0–0.9)
MONOCYTES NFR BLD AUTO: 6.7 % — SIGNIFICANT CHANGE UP (ref 2–14)
NEUTROPHILS # BLD AUTO: 4.79 K/UL — SIGNIFICANT CHANGE UP (ref 1.8–7.4)
NEUTROPHILS NFR BLD AUTO: 68.1 % — SIGNIFICANT CHANGE UP (ref 43–77)
NITRITE UR-MCNC: NEGATIVE — SIGNIFICANT CHANGE UP
NRBC # BLD: 0 /100 WBCS — SIGNIFICANT CHANGE UP (ref 0–0)
PCO2 BLDV: 36 MMHG — LOW (ref 42–55)
PH BLDV: 7.4 — SIGNIFICANT CHANGE UP (ref 7.32–7.43)
PH UR: 6 — SIGNIFICANT CHANGE UP (ref 5–8)
PLATELET # BLD AUTO: 262 K/UL — SIGNIFICANT CHANGE UP (ref 150–400)
PO2 BLDV: 59 MMHG — HIGH (ref 25–45)
POTASSIUM SERPL-MCNC: 5.5 MMOL/L — HIGH (ref 3.5–5.3)
POTASSIUM SERPL-MCNC: 5.6 MMOL/L — HIGH (ref 3.5–5.3)
POTASSIUM SERPL-SCNC: 5.5 MMOL/L — HIGH (ref 3.5–5.3)
POTASSIUM SERPL-SCNC: 5.6 MMOL/L — HIGH (ref 3.5–5.3)
PROT SERPL-MCNC: 7.2 GM/DL — SIGNIFICANT CHANGE UP (ref 6–8.3)
PROT UR-MCNC: SIGNIFICANT CHANGE UP MG/DL
RBC # BLD: 4.75 M/UL — SIGNIFICANT CHANGE UP (ref 4.2–5.8)
RBC # FLD: 13.8 % — SIGNIFICANT CHANGE UP (ref 10.3–14.5)
RBC CASTS # UR COMP ASSIST: 0 /HPF — SIGNIFICANT CHANGE UP (ref 0–4)
SAO2 % BLDV: 91.6 % — LOW (ref 94–98)
SODIUM SERPL-SCNC: 133 MMOL/L — LOW (ref 135–145)
SP GR SPEC: 1 — SIGNIFICANT CHANGE UP (ref 1–1.03)
UROBILINOGEN FLD QL: 0.2 MG/DL — SIGNIFICANT CHANGE UP (ref 0.2–1)
WBC # BLD: 7.04 K/UL — SIGNIFICANT CHANGE UP (ref 3.8–10.5)
WBC # FLD AUTO: 7.04 K/UL — SIGNIFICANT CHANGE UP (ref 3.8–10.5)
WBC UR QL: 10 /HPF — HIGH (ref 0–5)

## 2024-02-20 PROCEDURE — 93010 ELECTROCARDIOGRAM REPORT: CPT

## 2024-02-20 PROCEDURE — 99285 EMERGENCY DEPT VISIT HI MDM: CPT

## 2024-02-20 RX ORDER — INSULIN HUMAN 100 [IU]/ML
3 INJECTION, SOLUTION SUBCUTANEOUS ONCE
Refills: 0 | Status: DISCONTINUED | OUTPATIENT
Start: 2024-02-20 | End: 2024-02-20

## 2024-02-20 RX ORDER — INSULIN HUMAN 100 [IU]/ML
3 INJECTION, SOLUTION SUBCUTANEOUS ONCE
Refills: 0 | Status: COMPLETED | OUTPATIENT
Start: 2024-02-20 | End: 2024-02-20

## 2024-02-20 RX ORDER — SODIUM CHLORIDE 9 MG/ML
500 INJECTION INTRAMUSCULAR; INTRAVENOUS; SUBCUTANEOUS ONCE
Refills: 0 | Status: COMPLETED | OUTPATIENT
Start: 2024-02-20 | End: 2024-02-20

## 2024-02-20 RX ADMIN — INSULIN HUMAN 3 UNIT(S): 100 INJECTION, SOLUTION SUBCUTANEOUS at 19:09

## 2024-02-20 RX ADMIN — SODIUM CHLORIDE 500 MILLILITER(S): 9 INJECTION INTRAMUSCULAR; INTRAVENOUS; SUBCUTANEOUS at 22:57

## 2024-02-20 NOTE — ED ADULT NURSE NOTE - CAS EDN DISCHARGE ASSESSMENT
respirations spontaneous and unlabored, pt ambulates with steady gait/Alert and oriented to person, place and time/Patient baseline mental status/Awake

## 2024-02-20 NOTE — ED PROVIDER NOTE - OBJECTIVE STATEMENT
62y/o male with PMHx CVA (no residual deficits), HTN, HLD, T2DM, EF (32%), CAD (on Eliquis 5mg BID) who presents with increased thirst and urinary frequency in the setting of recently being on a cruise and dietary indiscretion - pt reports similar prior where he improved with resumption of usual diet. States he is not sure if he is taking januvia but definitely takes metformin. He saw pmd today and glucose was elevated so referred to the ED. Patient denies chest pain, sob, abd pain, vomiting or fever/ illness

## 2024-02-20 NOTE — ED PROVIDER NOTE - PHYSICAL EXAMINATION
Gen: aox3,nad   Head: NCAT  ENT: Airway patent, dry mucous membranes, nasal passageways clear  Cardiac: Normal rate, normal rhythm, no murmurs  Respiratory: Lungs CTA B/L  Gastrointestinal: Abdomen soft, nontender, nondistended, no rebound, no guarding  MSK: No gross abnormalities, FROM of all four extremities, no edema  HEME: Extremities warm and well perfused   Skin: No rashes, no lesions  Neuro: No gross neurologic deficits, normal speech, no motor deficits

## 2024-02-20 NOTE — ED PROVIDER NOTE - PATIENT PORTAL LINK FT
You can access the FollowMyHealth Patient Portal offered by [facility] by registering at the following website: [email or web address]. By joining [employer]’s FollowMyHealth portal, you will also be able to view your health information using other applications (apps) compatible with our system.

## 2024-02-20 NOTE — ED PROVIDER NOTE - INTERNATIONAL TRAVEL COUNTRIES
[facility] Pregnancy And Lactation Text: This medication is Pregnancy Category X and is absolutely contraindicated during pregnancy. It is unknown if it is excreted in breast milk.

## 2024-02-20 NOTE — ED PROVIDER NOTE - NSFOLLOWUPINSTRUCTIONS_ED_ALL_ED_FT
You were seen today for elevated blood sugar - your sugar improved with minimal insulin    We will not start you on insulin but recommend you monitor your sugars three times a day and before meals    Follow up with your primary care physician this week for repeat evaluation and resume your home diabetes medications    Comply with low carbohydrate diet    Return for any chest pain, nausea, vomiting, dizziness, fainting or worsening symptoms or significant dehydration     Take a copy of your lab results to your primary care physician this week. We did send an A1C but we do not yet have results.

## 2024-02-20 NOTE — ED PROVIDER NOTE - CLINICAL SUMMARY MEDICAL DECISION MAKING FREE TEXT BOX
64y/o male with PMHx CVA (no residual deficits), HTN, HLD, T2DM, EF (32%), CAD (on Eliquis 5mg BID) who presents with increased thirst and urinary frequency in the setting of recently being on a cruise and dietary indiscretion - pt reports similar prior where he improved with resumption of usual diet. States he is not sure if he is taking januvia but definitely takes metformin. He saw pmd today and glucose was elevated so referred to the ED. Patient denies chest pain, sob, abd pain, vomiting or fever/ illness   - rule out dka, send A1c, discussed care with pts pmd and concern for medication noncompliance, eval labs, gentle hydration due to fluid losses from hyperglycemia,   - iliana - improved with IV fluids, will hold off on change in medications, pt instructed to decrease carb intake and follow up with pmd this week 62y/o male with PMHx CVA (no residual deficits), HTN, HLD, T2DM, EF (32%), CAD (on Eliquis 5mg BID) who presents with increased thirst and urinary frequency in the setting of recently being on a cruise and dietary indiscretion - pt reports similar prior where he improved with resumption of usual diet. States he is not sure if he is taking januvia but definitely takes metformin. He saw pmd today and glucose was elevated so referred to the ED. Patient denies chest pain, sob, abd pain, vomiting or fever/ illness   - rule out dka, send A1c, discussed care with pts pmd Dr Antony (unsure of spelling but pt provided pmd's #) and concern for medication noncompliance who stated can see pt for rapid follow up, eval labs, gentle hydration due to fluid losses from hyperglycemia, due to hx chf will give  cc   - low dose insulin, recheck FS   - iliana - improved with IV fluids, will hold off on change in medications as pt plans to restrict diet, instructed to decrease carb intake and follow up with pmd this week, stable for dc

## 2024-02-20 NOTE — ED ADULT TRIAGE NOTE - CHIEF COMPLAINT QUOTE
sent by pmd for elevated blood sugar today known hx diabetes on metformin and januvia states for past 1 1/2- 2 weeks only using meds once a day instead of twice daily and was drinking soda a lot c/o feeling lightheaded denies vomiting slight nausea

## 2024-02-20 NOTE — ED PROVIDER NOTE - NSICDXPASTSURGICALHX_GEN_ALL_CORE_FT
PAST SURGICAL HISTORY:  History of nephrolithotomy with removal of calculi     S/P repair of pectoralis muscle tear due to lift weighting 2012

## 2024-02-21 VITALS
OXYGEN SATURATION: 100 % | SYSTOLIC BLOOD PRESSURE: 125 MMHG | RESPIRATION RATE: 14 BRPM | TEMPERATURE: 98 F | HEART RATE: 76 BPM | DIASTOLIC BLOOD PRESSURE: 86 MMHG

## 2024-02-21 LAB
A1C WITH ESTIMATED AVERAGE GLUCOSE RESULT: >15.5 % — HIGH (ref 4–5.6)
ANION GAP SERPL CALC-SCNC: 5 MMOL/L — SIGNIFICANT CHANGE UP (ref 5–17)
BUN SERPL-MCNC: 17 MG/DL — SIGNIFICANT CHANGE UP (ref 7–23)
CALCIUM SERPL-MCNC: 9 MG/DL — SIGNIFICANT CHANGE UP (ref 8.5–10.1)
CHLORIDE SERPL-SCNC: 110 MMOL/L — HIGH (ref 96–108)
CO2 SERPL-SCNC: 25 MMOL/L — SIGNIFICANT CHANGE UP (ref 22–31)
CREAT SERPL-MCNC: 1.31 MG/DL — HIGH (ref 0.5–1.3)
CULTURE RESULTS: SIGNIFICANT CHANGE UP
EGFR: 61 ML/MIN/1.73M2 — SIGNIFICANT CHANGE UP
ESTIMATED AVERAGE GLUCOSE: >398 MG/DL — HIGH (ref 68–114)
GLUCOSE SERPL-MCNC: 309 MG/DL — HIGH (ref 70–99)
POTASSIUM SERPL-MCNC: 4.8 MMOL/L — SIGNIFICANT CHANGE UP (ref 3.5–5.3)
POTASSIUM SERPL-SCNC: 4.8 MMOL/L — SIGNIFICANT CHANGE UP (ref 3.5–5.3)
SODIUM SERPL-SCNC: 140 MMOL/L — SIGNIFICANT CHANGE UP (ref 135–145)
SPECIMEN SOURCE: SIGNIFICANT CHANGE UP

## 2024-03-13 NOTE — ED ADULT NURSE NOTE - NSFALLCONCLUSION_ED_ALL_ED
[Erythematous Oropharynx] : erythematous oropharynx [Enlarged Tonsils] : enlarged tonsils [NL] : supple, full passive range of motion Universal Safety Interventions

## 2025-05-15 NOTE — H&P ADULT - NSICDXFAMHXNEG_GEN_ALL
I called Gianfranco from call and told him to call Dr FIDEL Coleman office.    asthma/kidney disease

## 2025-07-29 NOTE — PATIENT PROFILE ADULT - VISION (WITH CORRECTIVE LENSES IF THE PATIENT USUALLY WEARS THEM):
Needs RHC and FU TBD based on results. Please schedule and Betty watch for results. 
Normal vision: sees adequately in most situations; can see medication labels, newsprint